# Patient Record
Sex: FEMALE | Race: WHITE | Employment: UNEMPLOYED | URBAN - METROPOLITAN AREA
[De-identification: names, ages, dates, MRNs, and addresses within clinical notes are randomized per-mention and may not be internally consistent; named-entity substitution may affect disease eponyms.]

---
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
## 2025-01-21 ENCOUNTER — SPECIALTY PHARMACY (OUTPATIENT)
Dept: PHARMACY | Facility: CLINIC | Age: 20
End: 2025-01-21

## 2025-01-21 NOTE — PROGRESS NOTES
[facility] Clinical Note  Patient Reassessment     Introduction  Bree Wood is a 19 y.o. female who is on the specialty pharmacy service for management of: Miscellaneous Non-Core.    [facility] supplied medication: Firazyr    Duration of therapy: Patient/Prescriber Specific- as needed for HAE attack    The most recent encounter visit with the referring prescriber Aniceto Calvin MD on 9/4/2024 was reviewed. Pharmacy will continue to collaborate in the care of this patient with the referring prescriber.    Discussion  Bree was contacted on 1/21/2025 at 1:00 PM for a pharmacy visit with encounter number [phone] from:   [facility]  [address] [place] [id number]  Dept: [phone]  Dept Fax: [phone]  Bree consented to a Telephone visit, which was performed.    Efficacy  Patient has developed new symptoms of condition: No  Patient/caregiver feels medication is affecting the disease state: Patient reports no recent swelling events or need for Firazyr administration while on Orladeyo for prevention.    Goals  Provided education on goals and possible outcomes of therapy:  Provided education on goals and possible outcomes of therapy:  Adherence with therapy  Timely completion of appropriate labs  Timely and appropriate follow up with provider  Identify and address medication interactions with presciption medications, OTC medications and supplements  Optimize or maintain quality of life  Patient status for the above goal(s): On track as no recent HAE symptoms (e.g. swelling) per patient    Tolerance  Patient has experienced side effects from this medication: Unable to fully assess if patient has side effects from Firazyr as has not needed recently. Patient reports no side effects from Orladeyo.  Changes to current therapy regimen: No    The follow-up timeline was discussed. Every person responds to and reacts to therapy  differently. Patient should be assessed for efficacy and tolerability in approximately: 3 months    Adherence  Patient Information  Informant: Self (Patient)  Demonstrates Understanding of Importance of Adherence: Yes  Does the patient have any barriers to self-administration (including physical and mental?): No  Action Taken to Mitigate Barriers for Self-Administration: Sister is support system who helps with injections  Support Network for Adherence: Family Member  Medication Information  Medication: icatibant acetate (Firazyr)  Patient Reported Missed Doses in the Last 4 Weeks:  (As needed medication - no recent swelling events on Orladeyo so no Firazyr doses needed per patient)  Adherence Estimation Source: Claims history  Barriers to Adherence: No Problems identified     The importance of adherence was discussed and patient/caregiver was advised to take the medication as prescribed by their provider. Encouraged patient/caregiver to call physician's office or specialty pharmacy if they have a question regarding a missed dose.    General Assessment  Changes to home medications, OTCs or supplements: No  Current Outpatient Medications   Medication Sig Dispense Refill    cetirizine (ZyrTEC) 10 mg tablet Take 1 tablet (10 mg) by mouth once daily. May increase to 10 mg twice a day or even 20 mg twice a day if still having breakouts 60 tablet 0    eletriptan (Relpax) 40 mg tablet Take 1 tablet (40 mg) by mouth 1 time if needed for migraine (at onset of headache). May repeat in 2 hours if unresolved. Do not exceed 80 mg in 24 hours.      famotidine (Pepcid) 20 mg tablet Take 1 tablet (20 mg) by mouth 2 times a day. 120 tablet 0    fexofenadine (Allegra) 60 mg tablet Take 1 tablet (60 mg) by mouth 2 times a day.      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 Inject under the skin in the abdominal area. If no response or symptoms  recur, additional injections may be administered at intervals of at least 6 hours - not to exceed more than 3 injections in 24 hours. 9 mL 1    lanadelumab (Takhzyro) 300 mg/2 mL (150 mg/mL) injection Inject 2 mL (300 mg) under the skin every 14 (fourteen) days. 4 mL 11    norethindrone ac-eth estradioL (Loestrin 1/20, 21,) 1-20 mg-mcg tablet Take 1 tablet by mouth once daily.      norethindrone-e.estradioL-iron (Microgestin FE 1.5/30) 1.5 mg-30 mcg (21)/75 mg (7) tablet Take 1 tablet by mouth once daily. 90 tablet 3    ondansetron ODT (Zofran-ODT) 4 mg disintegrating tablet Take 1 tablet (4 mg) by mouth every 8 hours if needed for nausea or vomiting. 30 tablet 0    Orladeyo capsule Take 1 capsule (110 mg) by mouth once daily.      rimegepant (Nurtec ODT) 75 mg tablet,disintegrating Take 1 tablet (75 mg) by mouth 1 time if needed (at onset of headache).       No current facility-administered medications for this visit.     Reported new allergies: No  Reported new medical conditions: No  Additional monitoring reviewed: Patient reported expiration date for on hand [place] is March 2025. Will need renewal prescription to set up another refill delivery before then. Pharmacy tasks made.  Is laboratory follow up needed? No    Advised to contact the pharmacy if there are any changes to the patient's medication list, including prescriptions, OTC medications, herbal products, or supplements.    Impression/Plan  This patient has not been identified as high risk due to Lack of high risk qualifiers.  The following action was taken: Patient/caregiver encouraged to participate in patient management program.    QOL/Patient Satisfaction  Rate your quality of life on scale of 1-10:  (Unable to assess)  Rate your satisfaction with  Specialty Pharmacy on scale of 1-10: 10 - Completely satisfied (No issues reported)    Provided contact information ([phone]) for [facility] and reviewed dispensing  process, refill timeline and patient management follow up. Confirmed understanding of education conducted during assessment. All questions and concerns were addressed and patient/caregiver was encouraged to reach out for additional questions or concerns.    Based on the patient's diagnosis, medication list, progress towards goals, adherence, tolerance, and medication list, medication remains appropriate: I attest    Jeri Thornton, PharmD

## 2025-02-05 ENCOUNTER — HOSPITAL ENCOUNTER (EMERGENCY)
Facility: HOSPITAL | Age: 20
Discharge: HOME | End: 2025-02-05
Attending: STUDENT IN AN ORGANIZED HEALTH CARE EDUCATION/TRAINING PROGRAM
Payer: COMMERCIAL

## 2025-02-05 VITALS
BODY MASS INDEX: 23.6 KG/M2 | SYSTOLIC BLOOD PRESSURE: 118 MMHG | WEIGHT: 125 LBS | HEART RATE: 64 BPM | TEMPERATURE: 97.2 F | DIASTOLIC BLOOD PRESSURE: 74 MMHG | RESPIRATION RATE: 17 BRPM | OXYGEN SATURATION: 98 % | HEIGHT: 61 IN

## 2025-02-05 DIAGNOSIS — R30.0 DYSURIA: Primary | ICD-10-CM

## 2025-02-05 LAB
APPEARANCE UR: CLEAR
BILIRUB UR STRIP.AUTO-MCNC: NEGATIVE MG/DL
CLUE CELLS SPEC QL WET PREP: NORMAL
COLOR UR: YELLOW
GLUCOSE UR STRIP.AUTO-MCNC: NEGATIVE MG/DL
HCG UR QL IA.RAPID: NEGATIVE
HOLD SPECIMEN: NORMAL
KETONES UR STRIP.AUTO-MCNC: NEGATIVE MG/DL
LEUKOCYTE ESTERASE UR QL STRIP.AUTO: ABNORMAL
MUCOUS THREADS #/AREA URNS AUTO: NORMAL /LPF
NITRITE UR QL STRIP.AUTO: NEGATIVE
PH UR STRIP.AUTO: 5 [PH]
PROT UR STRIP.AUTO-MCNC: NEGATIVE MG/DL
RBC # UR STRIP.AUTO: NEGATIVE MG/DL
RBC #/AREA URNS AUTO: NORMAL /HPF
SP GR UR STRIP.AUTO: 1.02
SQUAMOUS #/AREA URNS AUTO: NORMAL /HPF
T VAGINALIS SPEC QL WET PREP: NORMAL
UROBILINOGEN UR STRIP.AUTO-MCNC: <2 MG/DL
WBC #/AREA URNS AUTO: NORMAL /HPF
WBC VAG QL WET PREP: NORMAL
YEAST VAG QL WET PREP: NORMAL

## 2025-02-05 PROCEDURE — 87086 URINE CULTURE/COLONY COUNT: CPT | Mod: CONLAB | Performed by: STUDENT IN AN ORGANIZED HEALTH CARE EDUCATION/TRAINING PROGRAM

## 2025-02-05 PROCEDURE — 81025 URINE PREGNANCY TEST: CPT | Performed by: STUDENT IN AN ORGANIZED HEALTH CARE EDUCATION/TRAINING PROGRAM

## 2025-02-05 PROCEDURE — 87210 SMEAR WET MOUNT SALINE/INK: CPT | Performed by: STUDENT IN AN ORGANIZED HEALTH CARE EDUCATION/TRAINING PROGRAM

## 2025-02-05 PROCEDURE — 87491 CHLMYD TRACH DNA AMP PROBE: CPT | Mod: CONLAB | Performed by: STUDENT IN AN ORGANIZED HEALTH CARE EDUCATION/TRAINING PROGRAM

## 2025-02-05 PROCEDURE — 99283 EMERGENCY DEPT VISIT LOW MDM: CPT | Performed by: STUDENT IN AN ORGANIZED HEALTH CARE EDUCATION/TRAINING PROGRAM

## 2025-02-05 PROCEDURE — 2500000001 HC RX 250 WO HCPCS SELF ADMINISTERED DRUGS (ALT 637 FOR MEDICARE OP): Mod: SE

## 2025-02-05 PROCEDURE — 81001 URINALYSIS AUTO W/SCOPE: CPT | Performed by: STUDENT IN AN ORGANIZED HEALTH CARE EDUCATION/TRAINING PROGRAM

## 2025-02-05 RX ORDER — DOXYCYCLINE 100 MG/1
100 CAPSULE ORAL 2 TIMES DAILY
Qty: 20 CAPSULE | Refills: 0 | Status: SHIPPED | OUTPATIENT
Start: 2025-02-05 | End: 2025-02-15

## 2025-02-05 RX ORDER — CEFTRIAXONE 500 MG/1
500 INJECTION, POWDER, FOR SOLUTION INTRAMUSCULAR; INTRAVENOUS ONCE
Status: DISCONTINUED | OUTPATIENT
Start: 2025-02-05 | End: 2025-02-05 | Stop reason: HOSPADM

## 2025-02-05 RX ORDER — DOXYCYCLINE HYCLATE 100 MG
TABLET ORAL
Status: COMPLETED
Start: 2025-02-05 | End: 2025-02-05

## 2025-02-05 RX ORDER — DOXYCYCLINE HYCLATE 100 MG
100 TABLET ORAL ONCE
Status: COMPLETED | OUTPATIENT
Start: 2025-02-05 | End: 2025-02-05

## 2025-02-05 RX ADMIN — DOXYCYCLINE HYCLATE 100 MG: 100 TABLET, COATED ORAL at 11:21

## 2025-02-05 RX ADMIN — Medication 100 MG: at 11:21

## 2025-02-05 ASSESSMENT — PAIN - FUNCTIONAL ASSESSMENT
PAIN_FUNCTIONAL_ASSESSMENT: 0-10
PAIN_FUNCTIONAL_ASSESSMENT: 0-10

## 2025-02-05 ASSESSMENT — PAIN SCALES - GENERAL
PAINLEVEL_OUTOF10: 0 - NO PAIN
PAINLEVEL_OUTOF10: 0 - NO PAIN

## 2025-02-05 NOTE — ED PROVIDER NOTES
"Chief Complaint: Dysuria  HPI: This is a 19-year-old female, presenting to the emergency department for dysuria as well as darker colored urine for the last 2 weeks, worse today.  Patient states that she was last sexually active around the time the symptoms started.  She denies any fevers or chills.  She does have some vaginal discharge, however states that there is \"no odor to it\"    Past Medical History:   Diagnosis Date    Hereditary angio-edema       History reviewed. No pertinent surgical history.    Physical Exam  Vitals and nursing note reviewed. Exam conducted with a chaperone present.   Constitutional:       Appearance: Normal appearance.   HENT:      Head: Normocephalic and atraumatic.      Mouth/Throat:      Mouth: Mucous membranes are moist.   Eyes:      Conjunctiva/sclera: Conjunctivae normal.   Cardiovascular:      Rate and Rhythm: Normal rate.   Pulmonary:      Effort: Pulmonary effort is normal.   Abdominal:      General: Abdomen is flat.      Palpations: Abdomen is soft.   Genitourinary:     General: Normal vulva.      Vagina: Normal.      Cervix: Discharge present.      Uterus: Normal.       Adnexa: Right adnexa normal and left adnexa normal.   Musculoskeletal:         General: Normal range of motion.      Cervical back: Normal range of motion.   Skin:     General: Skin is warm and dry.   Neurological:      General: No focal deficit present.      Mental Status: She is alert.   Psychiatric:         Mood and Affect: Mood normal.            ED Course/MDM  Diagnoses as of 02/05/25 1147   Dysuria       This is a 19 y.o. female presenting to the ED for evaluation of dysuria which began about 2 weeks ago and increased in severity today.  Patient states that she was sexually active shortly before the symptoms started.  On physical exam, patient is resting comfortably in bed, no acute distress.  Abdomen is soft and nontender.  Pelvic exam was performed with female chaperone at bedside which is concerning " for scant vaginal discharge.  No cervical motion tenderness.  No adnexal tenderness.  Urinalysis was not concerning for evidence of urinary tract infection.  Wet prep was not concerning for bacterial vaginosis or yeast infection.  I am concerned for possible STD as a cause of the patient's symptoms, as such the patient was given a dose of Rocephin IM as well as doxycycline and prescribed doxycycline for home.  She was advised she can discontinue the antibiotic if the testing comes back negative, however continue to take until that time.  She was advised to return to the emergency department for any new or worsening symptoms and was ultimately discharged home in stable condition.    Final Impression  1.  Dysuria  Disposition/Plan: Discharge home  Condition at disposition: Stable.     Kelly Cope DO  Emergency Medicine Physician     Kelly Cope DO  02/05/25 1148

## 2025-02-05 NOTE — DISCHARGE INSTRUCTIONS
Please follow the remainder of the testing results in your MyChart.  You should receive a call with any positive testing.  You can discontinue the antibiotic if the tests come back negative

## 2025-02-06 LAB
BACTERIA UR CULT: NO GROWTH
C TRACH RRNA SPEC QL NAA+PROBE: NEGATIVE
N GONORRHOEA DNA SPEC QL PROBE+SIG AMP: NEGATIVE

## 2025-02-10 ENCOUNTER — SPECIALTY PHARMACY (OUTPATIENT)
Dept: PHARMACY | Facility: CLINIC | Age: 20
End: 2025-02-10

## 2025-02-10 ENCOUNTER — TELEPHONE (OUTPATIENT)
Dept: ALLERGY | Facility: CLINIC | Age: 20
End: 2025-02-10
Payer: COMMERCIAL

## 2025-02-10 DIAGNOSIS — D84.1: ICD-10-CM

## 2025-02-10 DIAGNOSIS — R60.9 SWELLING: ICD-10-CM

## 2025-02-10 RX ORDER — ICATIBANT 30 MG/3ML
30 INJECTION, SOLUTION SUBCUTANEOUS ONCE AS NEEDED
Qty: 9 ML | Refills: 1 | Status: SHIPPED | OUTPATIENT
Start: 2025-02-10 | End: 2026-02-10

## 2025-02-10 NOTE — TELEPHONE ENCOUNTER
Called and spoke to patient. Patient states that usually swelling episodes of feet happen when she is walking a lot. Per patient, this episode is the worst its been and she has not been walking more than her usual amount. She did wear heels recently, but only for about an hour. She did state she is currently sick and has been for a couple of days with swollen lymph nodes, painful headache, runny nose, and dry throat. She denied having documented fevers, but stated she has felt warm to touch several times the last few days. Patient stated that the swelling has decreased, but is still noticeably swollen in feet. Email was sent to patient to send us photos.  Patient instructed to use 3rd dose if swelling increases or go to main [place] ER if symptoms do not improve. Patient was provided office number and to contact on call provider if significant swelling occurs over night. Patient was wondering if firazyr causes abdominal pain or nausea as a side effect because after injections today she has had those symptoms.     Thanks,   Keri

## 2025-02-10 NOTE — TELEPHONE ENCOUNTER
"----- Message from Aniceto Calvin sent at 2/10/2025 12:44 PM EST -----  Regarding: Angioedema follow up  Jeri Castro sent the message below through chat but we would need to please get a little bit more history on potential triggers. If Bree is still having swelling of her feet she can use a third dose of Firazyr 6 hours after the last one - which would be at 2PM today, but she should not get more than 3 injections in the space of 24 hours. Can she please e-mail us pictures of any areas with swelling if she has those so we can try to look at it? If not improving meanwhile, especially after the third injection, then she might need to go to the ER to at least get a dosage of C1 esterase inhibitor, which I would fear is not available at most [facility] ERs, so might need to go to [facility] for that and I would be able to talk to them there if so.    Just let me know what feedback we hear from Bree so then we can try to copy all of this information into a telephone note.    Thank you very much,    ANAM    Message  \"Hi, she just called me (on phone with her right now) saying she had to use 2 doses of Firazyr for feet swelling.  HUSSAIN  Does #1 late morning yesterday and second 8am this morning. Said feet hurt so much she has been using crutches to get around  4 mins  Keri Ortiz, RN was added by Jeri Thornton, PharmD.  4 mins  HUSSAIN Thornton, PharmD  Said she had been taking her orladeyo - no recent missed doses. Didn't do anything out of the ordinary that she think would have triggered it. Please advise.\"  "

## 2025-02-10 NOTE — PROGRESS NOTES
"Patient called my direct line reporting feet pain and swelling over the past few days. Stated her sister helped her administer 2 doses on Firazyr so far. First was late morning yesterday and second at 8am today. Patient noted ongoing symptoms and stated \"my feet hurt so bad I am using crutches\". Alerted allergist Dr. Calvin and RN right away who will follow up patient on plan. Patient denies any recent missed doses of Orladeyo or any activity out of the ordinary recently that could had triggered HAE swelling event.    Patient Firazyr prescription expires tomorrow and will need renewal prescription sent to pharmacy so she has medication on hand as needed for potential HAE attack in future. Will send order to  Specialty under protocol with MD.  "

## 2025-02-11 ENCOUNTER — HOSPITAL ENCOUNTER (EMERGENCY)
Facility: HOSPITAL | Age: 20
Discharge: HOME | End: 2025-02-11
Attending: EMERGENCY MEDICINE
Payer: COMMERCIAL

## 2025-02-11 ENCOUNTER — SPECIALTY PHARMACY (OUTPATIENT)
Dept: PHARMACY | Facility: CLINIC | Age: 20
End: 2025-02-11

## 2025-02-11 ENCOUNTER — TELEPHONE (OUTPATIENT)
Dept: ALLERGY | Facility: CLINIC | Age: 20
End: 2025-02-11
Payer: COMMERCIAL

## 2025-02-11 VITALS
HEIGHT: 61 IN | HEART RATE: 72 BPM | BODY MASS INDEX: 24.6 KG/M2 | DIASTOLIC BLOOD PRESSURE: 69 MMHG | WEIGHT: 130.29 LBS | RESPIRATION RATE: 19 BRPM | SYSTOLIC BLOOD PRESSURE: 105 MMHG | TEMPERATURE: 97.9 F | OXYGEN SATURATION: 99 %

## 2025-02-11 DIAGNOSIS — D84.1 HEREDITARY ANGIO-EDEMA: Primary | ICD-10-CM

## 2025-02-11 DIAGNOSIS — R11.2 NAUSEA AND VOMITING, UNSPECIFIED VOMITING TYPE: ICD-10-CM

## 2025-02-11 LAB
ALBUMIN SERPL BCP-MCNC: 4.1 G/DL (ref 3.4–5)
ALP SERPL-CCNC: 50 U/L (ref 33–110)
ALT SERPL W P-5'-P-CCNC: 16 U/L (ref 7–45)
ANION GAP SERPL CALC-SCNC: 11 MMOL/L (ref 10–20)
APPEARANCE UR: CLEAR
AST SERPL W P-5'-P-CCNC: 14 U/L (ref 9–39)
B-HCG SERPL-ACNC: <2 MIU/ML
BASOPHILS # BLD AUTO: 0.06 X10*3/UL (ref 0–0.1)
BASOPHILS NFR BLD AUTO: 0.5 %
BILIRUB SERPL-MCNC: 0.3 MG/DL (ref 0–1.2)
BILIRUB UR STRIP.AUTO-MCNC: NEGATIVE MG/DL
BUN SERPL-MCNC: 14 MG/DL (ref 6–23)
CALCIUM SERPL-MCNC: 8.9 MG/DL (ref 8.6–10.3)
CHLORIDE SERPL-SCNC: 102 MMOL/L (ref 98–107)
CO2 SERPL-SCNC: 28 MMOL/L (ref 21–32)
COLOR UR: YELLOW
CREAT SERPL-MCNC: 0.74 MG/DL (ref 0.5–1.05)
EGFRCR SERPLBLD CKD-EPI 2021: >90 ML/MIN/1.73M*2
EOSINOPHIL # BLD AUTO: 0.04 X10*3/UL (ref 0–0.7)
EOSINOPHIL NFR BLD AUTO: 0.3 %
ERYTHROCYTE [DISTWIDTH] IN BLOOD BY AUTOMATED COUNT: 11.5 % (ref 11.5–14.5)
FLUAV RNA RESP QL NAA+PROBE: NOT DETECTED
FLUBV RNA RESP QL NAA+PROBE: NOT DETECTED
GLUCOSE SERPL-MCNC: 120 MG/DL (ref 74–99)
GLUCOSE UR STRIP.AUTO-MCNC: NEGATIVE MG/DL
HCT VFR BLD AUTO: 49.5 % (ref 36–46)
HGB BLD-MCNC: 16.5 G/DL (ref 12–16)
HOLD SPECIMEN: NORMAL
HOLD SPECIMEN: NORMAL
IMM GRANULOCYTES # BLD AUTO: 0.05 X10*3/UL (ref 0–0.7)
IMM GRANULOCYTES NFR BLD AUTO: 0.4 % (ref 0–0.9)
KETONES UR STRIP.AUTO-MCNC: NEGATIVE MG/DL
LACTATE SERPL-SCNC: 1.7 MMOL/L (ref 0.4–2)
LEUKOCYTE ESTERASE UR QL STRIP.AUTO: NEGATIVE
LIPASE SERPL-CCNC: 8 U/L (ref 9–82)
LYMPHOCYTES # BLD AUTO: 3.09 X10*3/UL (ref 1.2–4.8)
LYMPHOCYTES NFR BLD AUTO: 24 %
MAGNESIUM SERPL-MCNC: 1.74 MG/DL (ref 1.6–2.4)
MCH RBC QN AUTO: 31 PG (ref 26–34)
MCHC RBC AUTO-ENTMCNC: 33.3 G/DL (ref 32–36)
MCV RBC AUTO: 93 FL (ref 80–100)
MONOCYTES # BLD AUTO: 0.71 X10*3/UL (ref 0.1–1)
MONOCYTES NFR BLD AUTO: 5.5 %
NEUTROPHILS # BLD AUTO: 8.92 X10*3/UL (ref 1.2–7.7)
NEUTROPHILS NFR BLD AUTO: 69.3 %
NITRITE UR QL STRIP.AUTO: NEGATIVE
NRBC BLD-RTO: 0 /100 WBCS (ref 0–0)
PH UR STRIP.AUTO: 5 [PH]
PLATELET # BLD AUTO: 270 X10*3/UL (ref 150–450)
POTASSIUM SERPL-SCNC: 4 MMOL/L (ref 3.5–5.3)
PROT SERPL-MCNC: 6.7 G/DL (ref 6.4–8.2)
PROT UR STRIP.AUTO-MCNC: NEGATIVE MG/DL
RBC # BLD AUTO: 5.32 X10*6/UL (ref 4–5.2)
RBC # UR STRIP.AUTO: NEGATIVE MG/DL
SARS-COV-2 RNA RESP QL NAA+PROBE: NOT DETECTED
SODIUM SERPL-SCNC: 137 MMOL/L (ref 136–145)
SP GR UR STRIP.AUTO: 1.01
UROBILINOGEN UR STRIP.AUTO-MCNC: <2 MG/DL
WBC # BLD AUTO: 12.9 X10*3/UL (ref 4.4–11.3)

## 2025-02-11 PROCEDURE — 99285 EMERGENCY DEPT VISIT HI MDM: CPT | Mod: 25 | Performed by: EMERGENCY MEDICINE

## 2025-02-11 PROCEDURE — 2500000001 HC RX 250 WO HCPCS SELF ADMINISTERED DRUGS (ALT 637 FOR MEDICARE OP): Mod: JZ,SE | Performed by: FAMILY MEDICINE

## 2025-02-11 PROCEDURE — 96374 THER/PROPH/DIAG INJ IV PUSH: CPT

## 2025-02-11 PROCEDURE — 84702 CHORIONIC GONADOTROPIN TEST: CPT | Performed by: EMERGENCY MEDICINE

## 2025-02-11 PROCEDURE — 96376 TX/PRO/DX INJ SAME DRUG ADON: CPT

## 2025-02-11 PROCEDURE — 83605 ASSAY OF LACTIC ACID: CPT | Performed by: EMERGENCY MEDICINE

## 2025-02-11 PROCEDURE — 83735 ASSAY OF MAGNESIUM: CPT | Performed by: EMERGENCY MEDICINE

## 2025-02-11 PROCEDURE — 81003 URINALYSIS AUTO W/O SCOPE: CPT | Performed by: EMERGENCY MEDICINE

## 2025-02-11 PROCEDURE — RXMED WILLOW AMBULATORY MEDICATION CHARGE

## 2025-02-11 PROCEDURE — 85025 COMPLETE CBC W/AUTO DIFF WBC: CPT | Performed by: EMERGENCY MEDICINE

## 2025-02-11 PROCEDURE — 99284 EMERGENCY DEPT VISIT MOD MDM: CPT | Mod: 25

## 2025-02-11 PROCEDURE — 2500000004 HC RX 250 GENERAL PHARMACY W/ HCPCS (ALT 636 FOR OP/ED): Mod: SE | Performed by: EMERGENCY MEDICINE

## 2025-02-11 PROCEDURE — 80053 COMPREHEN METABOLIC PANEL: CPT | Performed by: EMERGENCY MEDICINE

## 2025-02-11 PROCEDURE — 96361 HYDRATE IV INFUSION ADD-ON: CPT

## 2025-02-11 PROCEDURE — 36415 COLL VENOUS BLD VENIPUNCTURE: CPT | Performed by: EMERGENCY MEDICINE

## 2025-02-11 PROCEDURE — 83690 ASSAY OF LIPASE: CPT | Performed by: EMERGENCY MEDICINE

## 2025-02-11 PROCEDURE — 96375 TX/PRO/DX INJ NEW DRUG ADDON: CPT

## 2025-02-11 PROCEDURE — 87636 SARSCOV2 & INF A&B AMP PRB: CPT | Performed by: EMERGENCY MEDICINE

## 2025-02-11 RX ORDER — SODIUM CHLORIDE 9 MG/ML
125 INJECTION, SOLUTION INTRAVENOUS CONTINUOUS
Status: DISCONTINUED | OUTPATIENT
Start: 2025-02-11 | End: 2025-02-11 | Stop reason: HOSPADM

## 2025-02-11 RX ORDER — KETOROLAC TROMETHAMINE 15 MG/ML
15 INJECTION, SOLUTION INTRAMUSCULAR; INTRAVENOUS ONCE
Status: COMPLETED | OUTPATIENT
Start: 2025-02-11 | End: 2025-02-11

## 2025-02-11 RX ORDER — ONDANSETRON 4 MG/1
4 TABLET, ORALLY DISINTEGRATING ORAL EVERY 8 HOURS PRN
Qty: 20 TABLET | Refills: 0 | Status: SHIPPED | OUTPATIENT
Start: 2025-02-11 | End: 2025-02-18

## 2025-02-11 RX ORDER — ONDANSETRON HYDROCHLORIDE 2 MG/ML
4 INJECTION, SOLUTION INTRAVENOUS ONCE
Status: COMPLETED | OUTPATIENT
Start: 2025-02-11 | End: 2025-02-11

## 2025-02-11 RX ADMIN — SODIUM CHLORIDE 1000 ML: 9 INJECTION, SOLUTION INTRAVENOUS at 03:35

## 2025-02-11 RX ADMIN — SODIUM CHLORIDE 125 ML/HR: 9 INJECTION, SOLUTION INTRAVENOUS at 08:07

## 2025-02-11 RX ADMIN — ONDANSETRON 4 MG: 2 INJECTION INTRAMUSCULAR; INTRAVENOUS at 08:07

## 2025-02-11 RX ADMIN — KETOROLAC TROMETHAMINE 15 MG: 15 INJECTION, SOLUTION INTRAMUSCULAR; INTRAVENOUS at 08:07

## 2025-02-11 RX ADMIN — C1 ESTERASE INHIBITOR RECOMBINANT 1500 UNITS: 2100 INJECTION, POWDER, FOR SOLUTION INTRAVENOUS at 09:14

## 2025-02-11 RX ADMIN — ONDANSETRON 4 MG: 2 INJECTION INTRAMUSCULAR; INTRAVENOUS at 03:35

## 2025-02-11 ASSESSMENT — PAIN SCALES - GENERAL
PAINLEVEL_OUTOF10: 8
PAINLEVEL_OUTOF10: 5 - MODERATE PAIN
PAINLEVEL_OUTOF10: 1

## 2025-02-11 ASSESSMENT — PAIN DESCRIPTION - DESCRIPTORS: DESCRIPTORS: ACHING

## 2025-02-11 ASSESSMENT — PAIN DESCRIPTION - LOCATION
LOCATION: ABDOMEN
LOCATION: FOOT

## 2025-02-11 ASSESSMENT — PAIN DESCRIPTION - ORIENTATION: ORIENTATION: RIGHT;LEFT

## 2025-02-11 ASSESSMENT — PAIN DESCRIPTION - PAIN TYPE: TYPE: ACUTE PAIN

## 2025-02-11 ASSESSMENT — PAIN - FUNCTIONAL ASSESSMENT
PAIN_FUNCTIONAL_ASSESSMENT: 0-10
PAIN_FUNCTIONAL_ASSESSMENT: 0-10

## 2025-02-11 NOTE — TELEPHONE ENCOUNTER
I received a call from the [facility] ED team regarding Bree Wood today at 3:43AM. As described in more detail on note from yesterday (2/10), Bree has been having symptoms compatible with a possible viral infection since 2/9, with fever, headache, nasal discharge, and dry throat. In the setting of this she had a flare-up of her HAE, with swelling of her feet, abdominal pain, nausea, and vomiting. She used one dose of her PRN medication - icatibant (Firazyr) on 2/9 and then a second dosage on 2/10 at 8AM, with some improvement, but still presence of swelling. We recommended a third dose after 2PM on 2/10 if still symptomatic, but also discussed ED visit if refractory to that or any symptom worsening. During the rest of the day yesterday Bree had worsening of her abdominal symptoms, with increased abdominal pain and vomiting so she went to the [facility] ED tonight, taking her rescue IV C1 inhibitor product - Ruconest - with her. Discussed her condition with the ED team and recommended administering dose of Ruconest - Bree's dosage is 3000 units (20 mL of a 150 U/mL after reconstitution of two vials with 14 mL each) slowly IV over approximately 5 minutes for acute attack of HAE. If still symptomatic may use a second dose of 3000 units 2-4 hours later. We also discussed that if abdominal symptoms persist Bree may benefit from getting an abdominal CT to look for evidence of intra-abdominal swelling in the setting of her HAE or rule out other potential causes for her symptoms.

## 2025-02-11 NOTE — TELEPHONE ENCOUNTER
Called Bree to follow up on her ED visit last night/today, call went to voicemail, left a message prompting a call back to assess her for improvement.

## 2025-02-11 NOTE — ED NOTES
Administered patient's own Rocunest (C1 esterase inhibitor - Recombinant) at 3000 units as prescribed by Dr. Calvin of Immunology and cleared by Dr. Ang after phone consult with Pharmacy and on call Immunology.   2 vials of 2100u Rocunest reconstituted (150units/1ml) for IV administration as instructed by  instructions and [employer] resources. 3000 units drawn up (20ml) and administered via IV over 5 minutes. Math checked by Nursing supervisor Eliseo Olivera RN.   Pt has no signs of distress, HR and Spo2 remained unchanged through administration.   RX# [id number]    LOT# [id number]  EXP:      Reva Rojas RN  02/11/25 0511

## 2025-02-11 NOTE — PROGRESS NOTES
"Received phone call from Jessi Fishman PharmD after patient presented to [place] ED regarding \"some questions about what medications she takes at home\". Reviewed Orladeyo is taken for HAE prophylaxis and Firazyr for acute HAE attack. I also reviewed timeline of events with Jessi Fishman PharmD. On 2/10, I received a call from patient who reported feet swelling and use of 2 doses of Firazyr. First was late morning on 2/9 and second was around 8am on 2/10. At the time of this call, I alerted Bree's allergist/immunologist who advised patient to give third dose of Firazyr after 2pm (based on time patient reported second dose was given; injections should be administered at intervals of at least 6 hours with a max of 3 injections in 24 hours) and to go to ED if still symptomatic.    Per Jessi Fishman, patient brought her Ruconest with her to the ED. Per [facility] chart note on 2/11, patient's allergist/immunologist (Aniceto Calvin MD) was consulted to discussed her condition with the ED team and he recommended administering \"Ruconest 3000 units (20 mL of a 150 U/mL after reconstitution of two vials with 14 mL each) slowly IV over approximately 5 minutes for acute attack of HAE. If still symptomatic may use a second dose of 3000 units 2-4 hours later. We also discussed that if abdominal symptoms persist Bree may benefit from getting an abdominal CT to look for evidence of intra-abdominal swelling in the setting of her HAE or rule out other potential causes for her symptoms.\" Reiterated his recommendation to Jessi Fishman PharmD.     Specialty Pharmacy currently working on refill of patient's Firazyr to have on hand at home in case of future HAE attack.      "

## 2025-02-11 NOTE — ED TRIAGE NOTES
Pt to ED for reports of foot swelling and abd swelling. Pt has hx of hereditary angioedema and takes PO meds daily for it. Prescribed subcutaneous injections which she took 2 doses of. Her Immunologist Dr. Gardner stated to come to the ER is worsening symptoms. Pt and her sister present with C1 inhibitor medication that she has stocked for these situations. On call immunology [phone]. Pt suggests conference with ER to on call doctor. Pt also vomiting and having diarrhea with sinusitis since Wednesday.    Pt was diagnosed at the age of 16 with an extensive family history. Pt states last flare up to require this medication was 1 year ago.

## 2025-02-11 NOTE — ED PROVIDER NOTES
HPI   Chief Complaint   Patient presents with   • Foot Swelling         History provided by:  Medical records, patient and relative   used: No      This patient presents to the emergency department via private vehicle for evaluation of abdominal pain, vomiting and swelling to the dorsum of both feet.  Patient reports she has a history of hereditary angioedema and believes this represents an exacerbation.  Patient states symptoms initially began 3 days ago which is the tops of her feet being swollen.  She did contact her immunologist and was told to start her Firazyr injections yesterday.  She took 2 of the recommended 3 doses, but then began to get the abdominal pain and bloating and vomiting.    Patient states she understood from her immunologist that she was to come to [facility] ER to get her IV Ruconest if she were to start vomiting.  Patient states that these are pretty typical symptoms for her exacerbations.  Her last exacerbation was a year ago.  Patient states that she cannot think of any specific trigger for this flare.    She does note she has had congestion and cough for about the past 3 days.  No fever.  She has had some diarrhea.  No urinary symptoms.  She follows with Dr. Calvin at allergy immunology at [facility].       Patient History   Past Medical History:   Diagnosis Date   • Hereditary angio-edema      History reviewed. No pertinent surgical history.  No family history on file.  Social History     Tobacco Use   • Smoking status: Never   • Smokeless tobacco: Never   Vaping Use   • Vaping status: Never Used   Substance Use Topics   • Alcohol use: Never   • Drug use: Yes     Types: Marijuana       Physical Exam   ED Triage Vitals   Temperature Heart Rate Respirations BP   02/11/25 0354 02/11/25 0316 02/11/25 0316 02/11/25 0316   36.7 °C (98 °F) (!) 119 19 129/88      SpO2 Temp src Heart Rate Source Patient Position   02/11/25 0316 -- -- --   99 %         BP Location  FiO2 (%)     -- --             Physical Exam  Vitals reviewed.   Constitutional:       Appearance: Normal appearance.   HENT:      Head: Normocephalic and atraumatic.      Nose: Nose normal.      Mouth/Throat:      Mouth: Mucous membranes are moist.      Comments: No angioedema  Eyes:      Extraocular Movements: Extraocular movements intact.      Conjunctiva/sclera: Conjunctivae normal.      Pupils: Pupils are equal, round, and reactive to light.   Cardiovascular:      Rate and Rhythm: Normal rate and regular rhythm.      Pulses: Normal pulses.      Heart sounds: Normal heart sounds.   Pulmonary:      Effort: Pulmonary effort is normal.      Breath sounds: Normal breath sounds.   Abdominal:      General: Abdomen is flat. Bowel sounds are normal.      Palpations: Abdomen is soft.      Tenderness: There is no abdominal tenderness.   Musculoskeletal:         General: Normal range of motion.      Cervical back: Normal range of motion and neck supple.      Right lower leg: No edema.      Left lower leg: No edema.   Skin:     General: Skin is warm and dry.      Capillary Refill: Capillary refill takes less than 2 seconds.      Findings: No rash.   Neurological:      General: No focal deficit present.      Mental Status: She is alert and oriented to person, place, and time.      Gait: Gait normal.   Psychiatric:         Mood and Affect: Mood normal.           ED Course & MDM   ED Course as of 02/11/25 0751   Tue Feb 11, 2025   0355 Discussed with Dr lazo immunology, advises Ruconest 3000 units IV, may repeat in 4 to 5 hours [MN]   0458 Patient reassessed, results reviewed feeling a little better, declines pain medication [MN]   0539 Patient reassessed [MN]   0621 Patient reassessed [MN]      ED Course User Index  [MN] Rimma Ang MD         Diagnoses as of 02/11/25 0751   Hereditary angio-edema   Nausea and vomiting, unspecified vomiting type          Labs Reviewed   CBC WITH AUTO DIFFERENTIAL - Abnormal        Result Value    WBC 12.9 (*)     nRBC 0.0      RBC 5.32 (*)     Hemoglobin 16.5 (*)     Hematocrit 49.5 (*)     MCV 93      MCH 31.0      MCHC 33.3      RDW 11.5      Platelets 270      Neutrophils % 69.3      Immature Granulocytes %, Automated 0.4      Lymphocytes % 24.0      Monocytes % 5.5      Eosinophils % 0.3      Basophils % 0.5      Neutrophils Absolute 8.92 (*)     Immature Granulocytes Absolute, Automated 0.05      Lymphocytes Absolute 3.09      Monocytes Absolute 0.71      Eosinophils Absolute 0.04      Basophils Absolute 0.06     COMPREHENSIVE METABOLIC PANEL - Abnormal    Glucose 120 (*)     Sodium 137      Potassium 4.0      Chloride 102      Bicarbonate 28      Anion Gap 11      Urea Nitrogen 14      Creatinine 0.74      eGFR >90      Calcium 8.9      Albumin 4.1      Alkaline Phosphatase 50      Total Protein 6.7      AST 14      Bilirubin, Total 0.3      ALT 16     LIPASE - Abnormal    Lipase 8 (*)     Narrative:     Venipuncture immediately after or during the administration of Metamizole may lead to falsely low results. Testing should be performed immediately prior to Metamizole dosing.   MAGNESIUM - Normal    Magnesium 1.74     LACTATE - Normal    Lactate 1.7      Narrative:     Venipuncture immediately after or during the administration of Metamizole may lead to falsely low results. Testing should be performed immediately prior to Metamizole dosing.   SARS-COV-2 AND INFLUENZA A/B PCR - Normal    Flu A Result Not Detected      Flu B Result Not Detected      Coronavirus 2019, PCR Not Detected      Narrative:     This assay is an FDA-cleared, in vitro diagnostic nucleic acid amplification test for the qualitative detection and differentiation of SARS CoV-2/ Influenza A/B from nasopharyngeal specimens collected from individuals with signs and symptoms of respiratory tract infections, and has been validated for use at [facility]. Negative results do not preclude COVID-19/  Influenza A/B infections and should not be used as the sole basis for diagnosis, treatment, or other management decisions. Testing for SARS CoV-2 is recommended only for patients who meet current clinical and/or epidemiological criteria defined by federal, state, or local public health directives.   URINALYSIS WITH REFLEX CULTURE AND MICROSCOPIC - Normal    Color, Urine Yellow      Appearance, Urine Clear      Specific Gravity, Urine 1.009      pH, Urine 5.0      Protein, Urine NEGATIVE      Glucose, Urine NEGATIVE      Blood, Urine NEGATIVE      Ketones, Urine NEGATIVE      Bilirubin, Urine NEGATIVE      Urobilinogen, Urine <2.0      Nitrite, Urine NEGATIVE      Leukocyte Esterase, Urine NEGATIVE     HUMAN CHORIONIC GONADOTROPIN, SERUM QUANTITATIVE - Normal    HCG, Beta-Quantitative <2      Narrative:      Total HCG measurement is performed using the [employer] Access   Immunoassay which detects intact HCG and free beta HCG subunit.    This test is not indicated for use as a tumor marker.   HCG testing is performed using a different test methodology at [facility] than other [facility]. Direct result comparison   should only be made within the same method.       URINALYSIS WITH REFLEX CULTURE AND MICROSCOPIC    Narrative:     The following orders were created for panel order Urinalysis with Reflex Culture and Microscopic.  Procedure                               Abnormality         Status                     ---------                               -----------         ------                     Urinalysis with Reflex C...[[id number]]  Normal              Final result               Extra Urine Gray Tube[[id number]]                            In process                   Please view results for these tests on the individual orders.   EXTRA URINE GRAY TUBE     ED Medication Administration from 02/11/2025 0303 to 02/11/2025 0536         Date/Time Order Dose Route Action Action by     02/11/2025  0335 EST ondansetron (Zofran) injection 4 mg 4 mg intravenous Given Bob, RASHARD     02/11/2025 0335 EST sodium chloride 0.9 % bolus 1,000 mL 1,000 mL intravenous New Bag Bob, RASHARD     02/11/2025 0430 EST sodium chloride 0.9 % bolus 1,000 mL 0 mL intravenous Stopped Bob, B                 No data recorded                                 Medical Decision Making  This patient presents emergency department the above history and physical.  No signs of sepsis, dehydration, acute abdomen.  No lower extremity edema, venous cords, calf tenderness.  Intact peripheral pulses, intact capillary refill.  No angioedema or rash.    Patient treated with IV fluids, IV Zofran.  Case was discussed by phone with Dr. Gardner the patient's allergist immunologist.  He concurs the patient should receive IV Ruconest 3000 units from the medication she brought from home.  He states that this medication may be repeated in 4 to 6 hours if her symptoms are not resolved.  If she continues to have symptoms beyond that she would require transfer to the main campus.    Laboratory evaluation includes negative pregnancy, negative COVID, negative flu.  Peripheral white count 12.9.  Chemistries remarkable only for glucose of 120.  Lactate is normal urinalysis shows no evidence of infection or hematuria.  Electrolytes are normal.    Patient had been starting to get some improvement in pain and resolution of the nausea.  She was passing stool regularly.  She was hopeful she would not need the second dose ofRuconest, I did a trial of oral crackers.  Patient states she feels like that is getting the pain ramped back up again so she believes she is going to need the second dose.  Initial dose was given at 4:35 AM.  Per Dr. Zhou she may get another dose 4 to 5 hours from the initial.  Patient does have adequate supplies for second dose and this will be administered at 9 AM.    0800 --Case signed out to Dr. Bernstein the [facility] physician to follow-up  on response to therapy and make final disposition.    Procedure  Procedures     Rimma Ang MD  02/11/25 0756

## 2025-02-11 NOTE — ED NOTES
Rocunest reconstituted (150units/1ml) for IV administration as instructed by  instructions and [employer] resources. 1500 units drawn up (10ml)(remaining drug amount) and administered via IV over 5 minutes. VSS, pt resting comfortably, no adverse reactions, will continue to monitor pt and try PO challenge in 1 hr. Pt agreeable to plan of care     Delphine Cagle RN  02/11/25 2584

## 2025-02-14 ENCOUNTER — PHARMACY VISIT (OUTPATIENT)
Dept: PHARMACY | Facility: CLINIC | Age: 20
End: 2025-02-14
Payer: MEDICAID

## 2025-02-17 ENCOUNTER — APPOINTMENT (OUTPATIENT)
Dept: RADIOLOGY | Facility: HOSPITAL | Age: 20
End: 2025-02-17
Payer: COMMERCIAL

## 2025-02-17 ENCOUNTER — TELEPHONE (OUTPATIENT)
Dept: ALLERGY | Facility: CLINIC | Age: 20
End: 2025-02-17
Payer: COMMERCIAL

## 2025-02-17 ENCOUNTER — HOSPITAL ENCOUNTER (EMERGENCY)
Facility: HOSPITAL | Age: 20
Discharge: HOME | End: 2025-02-17
Attending: EMERGENCY MEDICINE
Payer: COMMERCIAL

## 2025-02-17 ENCOUNTER — CLINICAL SUPPORT (OUTPATIENT)
Dept: EMERGENCY MEDICINE | Facility: HOSPITAL | Age: 20
End: 2025-02-17
Payer: COMMERCIAL

## 2025-02-17 VITALS
HEIGHT: 61 IN | RESPIRATION RATE: 18 BRPM | SYSTOLIC BLOOD PRESSURE: 114 MMHG | HEART RATE: 87 BPM | WEIGHT: 125 LBS | TEMPERATURE: 97.5 F | BODY MASS INDEX: 23.6 KG/M2 | OXYGEN SATURATION: 98 % | DIASTOLIC BLOOD PRESSURE: 68 MMHG

## 2025-02-17 DIAGNOSIS — R10.84 ABDOMINAL PAIN, GENERALIZED: ICD-10-CM

## 2025-02-17 DIAGNOSIS — D84.1: Primary | ICD-10-CM

## 2025-02-17 LAB
ANION GAP SERPL CALC-SCNC: 16 MMOL/L (ref 10–20)
BASOPHILS # BLD MANUAL: 0 X10*3/UL (ref 0–0.1)
BASOPHILS NFR BLD MANUAL: 0 %
BUN SERPL-MCNC: 19 MG/DL (ref 6–23)
CALCIUM SERPL-MCNC: 8.7 MG/DL (ref 8.6–10.6)
CHLORIDE SERPL-SCNC: 103 MMOL/L (ref 98–107)
CO2 SERPL-SCNC: 20 MMOL/L (ref 21–32)
CREAT SERPL-MCNC: 0.63 MG/DL (ref 0.5–1.05)
EGFRCR SERPLBLD CKD-EPI 2021: >90 ML/MIN/1.73M*2
EOSINOPHIL # BLD MANUAL: 0 X10*3/UL (ref 0–0.7)
EOSINOPHIL NFR BLD MANUAL: 0 %
ERYTHROCYTE [DISTWIDTH] IN BLOOD BY AUTOMATED COUNT: 11.3 % (ref 11.5–14.5)
FLUAV RNA RESP QL NAA+PROBE: NOT DETECTED
FLUBV RNA RESP QL NAA+PROBE: NOT DETECTED
GLUCOSE SERPL-MCNC: 99 MG/DL (ref 74–99)
HCT VFR BLD AUTO: 44.6 % (ref 36–46)
HGB BLD-MCNC: 15.8 G/DL (ref 12–16)
IMM GRANULOCYTES # BLD AUTO: 0.05 X10*3/UL (ref 0–0.7)
IMM GRANULOCYTES NFR BLD AUTO: 0.4 % (ref 0–0.9)
LYMPHOCYTES # BLD MANUAL: 2.15 X10*3/UL (ref 1.2–4.8)
LYMPHOCYTES NFR BLD MANUAL: 15 %
MAGNESIUM SERPL-MCNC: 2.07 MG/DL (ref 1.6–2.4)
MCH RBC QN AUTO: 30.9 PG (ref 26–34)
MCHC RBC AUTO-ENTMCNC: 35.4 G/DL (ref 32–36)
MCV RBC AUTO: 87 FL (ref 80–100)
MONOCYTES # BLD MANUAL: 1.14 X10*3/UL (ref 0.1–1)
MONOCYTES NFR BLD MANUAL: 8 %
NEUTROPHILS # BLD MANUAL: 10.58 X10*3/UL (ref 1.2–7.7)
NEUTS BAND # BLD MANUAL: 1.43 X10*3/UL (ref 0–0.7)
NEUTS BAND NFR BLD MANUAL: 10 %
NEUTS SEG # BLD MANUAL: 9.15 X10*3/UL (ref 1.2–7)
NEUTS SEG NFR BLD MANUAL: 64 %
NRBC BLD-RTO: 0 /100 WBCS (ref 0–0)
PLATELET # BLD AUTO: 307 X10*3/UL (ref 150–450)
POTASSIUM SERPL-SCNC: 4.4 MMOL/L (ref 3.5–5.3)
RBC # BLD AUTO: 5.11 X10*6/UL (ref 4–5.2)
RBC MORPH BLD: ABNORMAL
SARS-COV-2 RNA RESP QL NAA+PROBE: NOT DETECTED
SODIUM SERPL-SCNC: 135 MMOL/L (ref 136–145)
TOTAL CELLS COUNTED BLD: 100
VARIANT LYMPHS # BLD MANUAL: 0.43 X10*3/UL (ref 0–0.5)
VARIANT LYMPHS NFR BLD: 3 %
WBC # BLD AUTO: 14.3 X10*3/UL (ref 4.4–11.3)

## 2025-02-17 PROCEDURE — 80048 BASIC METABOLIC PNL TOTAL CA: CPT | Performed by: EMERGENCY MEDICINE

## 2025-02-17 PROCEDURE — 2550000001 HC RX 255 CONTRASTS: Mod: SE | Performed by: EMERGENCY MEDICINE

## 2025-02-17 PROCEDURE — 2500000004 HC RX 250 GENERAL PHARMACY W/ HCPCS (ALT 636 FOR OP/ED): Mod: SE | Performed by: EMERGENCY MEDICINE

## 2025-02-17 PROCEDURE — 96361 HYDRATE IV INFUSION ADD-ON: CPT

## 2025-02-17 PROCEDURE — 96375 TX/PRO/DX INJ NEW DRUG ADDON: CPT

## 2025-02-17 PROCEDURE — 93005 ELECTROCARDIOGRAM TRACING: CPT

## 2025-02-17 PROCEDURE — 85007 BL SMEAR W/DIFF WBC COUNT: CPT | Performed by: EMERGENCY MEDICINE

## 2025-02-17 PROCEDURE — 85027 COMPLETE CBC AUTOMATED: CPT | Performed by: EMERGENCY MEDICINE

## 2025-02-17 PROCEDURE — 83735 ASSAY OF MAGNESIUM: CPT

## 2025-02-17 PROCEDURE — 99285 EMERGENCY DEPT VISIT HI MDM: CPT | Mod: 25 | Performed by: EMERGENCY MEDICINE

## 2025-02-17 PROCEDURE — 74177 CT ABD & PELVIS W/CONTRAST: CPT

## 2025-02-17 PROCEDURE — 96376 TX/PRO/DX INJ SAME DRUG ADON: CPT

## 2025-02-17 PROCEDURE — 87636 SARSCOV2 & INF A&B AMP PRB: CPT | Performed by: EMERGENCY MEDICINE

## 2025-02-17 PROCEDURE — 36415 COLL VENOUS BLD VENIPUNCTURE: CPT | Performed by: EMERGENCY MEDICINE

## 2025-02-17 PROCEDURE — 99285 EMERGENCY DEPT VISIT HI MDM: CPT | Performed by: EMERGENCY MEDICINE

## 2025-02-17 PROCEDURE — 96374 THER/PROPH/DIAG INJ IV PUSH: CPT | Mod: 59

## 2025-02-17 PROCEDURE — 2500000004 HC RX 250 GENERAL PHARMACY W/ HCPCS (ALT 636 FOR OP/ED): Mod: SE

## 2025-02-17 RX ORDER — MORPHINE SULFATE 4 MG/ML
4 INJECTION INTRAVENOUS ONCE
Status: COMPLETED | OUTPATIENT
Start: 2025-02-17 | End: 2025-02-17

## 2025-02-17 RX ORDER — ICATIBANT 30 MG/3ML
30 INJECTION, SOLUTION SUBCUTANEOUS ONCE
Status: DISCONTINUED | OUTPATIENT
Start: 2025-02-17 | End: 2025-02-17

## 2025-02-17 RX ORDER — ONDANSETRON HYDROCHLORIDE 2 MG/ML
4 INJECTION, SOLUTION INTRAVENOUS ONCE
Status: COMPLETED | OUTPATIENT
Start: 2025-02-17 | End: 2025-02-17

## 2025-02-17 RX ADMIN — ONDANSETRON 4 MG: 2 INJECTION INTRAMUSCULAR; INTRAVENOUS at 17:01

## 2025-02-17 RX ADMIN — ONDANSETRON 4 MG: 2 INJECTION INTRAMUSCULAR; INTRAVENOUS at 14:42

## 2025-02-17 RX ADMIN — MORPHINE SULFATE 4 MG: 4 INJECTION INTRAVENOUS at 16:41

## 2025-02-17 RX ADMIN — SODIUM CHLORIDE, POTASSIUM CHLORIDE, SODIUM LACTATE AND CALCIUM CHLORIDE 1000 ML: 600; 310; 30; 20 INJECTION, SOLUTION INTRAVENOUS at 14:42

## 2025-02-17 RX ADMIN — IOHEXOL 75 ML: 350 INJECTION, SOLUTION INTRAVENOUS at 16:27

## 2025-02-17 ASSESSMENT — PAIN - FUNCTIONAL ASSESSMENT: PAIN_FUNCTIONAL_ASSESSMENT: 0-10

## 2025-02-17 ASSESSMENT — COLUMBIA-SUICIDE SEVERITY RATING SCALE - C-SSRS
6. HAVE YOU EVER DONE ANYTHING, STARTED TO DO ANYTHING, OR PREPARED TO DO ANYTHING TO END YOUR LIFE?: NO
2. HAVE YOU ACTUALLY HAD ANY THOUGHTS OF KILLING YOURSELF?: NO
1. IN THE PAST MONTH, HAVE YOU WISHED YOU WERE DEAD OR WISHED YOU COULD GO TO SLEEP AND NOT WAKE UP?: NO

## 2025-02-17 ASSESSMENT — LIFESTYLE VARIABLES
EVER HAD A DRINK FIRST THING IN THE MORNING TO STEADY YOUR NERVES TO GET RID OF A HANGOVER: NO
HAVE YOU EVER FELT YOU SHOULD CUT DOWN ON YOUR DRINKING: NO
TOTAL SCORE: 0
EVER FELT BAD OR GUILTY ABOUT YOUR DRINKING: NO
HAVE PEOPLE ANNOYED YOU BY CRITICIZING YOUR DRINKING: NO

## 2025-02-17 ASSESSMENT — PAIN DESCRIPTION - FREQUENCY: FREQUENCY: CONSTANT/CONTINUOUS

## 2025-02-17 ASSESSMENT — PAIN DESCRIPTION - PAIN TYPE: TYPE: ACUTE PAIN

## 2025-02-17 ASSESSMENT — PAIN DESCRIPTION - LOCATION
LOCATION: ABDOMEN
LOCATION: ABDOMEN

## 2025-02-17 ASSESSMENT — PAIN SCALES - GENERAL
PAINLEVEL_OUTOF10: 8
PAINLEVEL_OUTOF10: 0 - NO PAIN
PAINLEVEL_OUTOF10: 3
PAINLEVEL_OUTOF10: 4
PAINLEVEL_OUTOF10: 10 - WORST POSSIBLE PAIN

## 2025-02-17 ASSESSMENT — PAIN DESCRIPTION - DESCRIPTORS: DESCRIPTORS: CRAMPING

## 2025-02-17 NOTE — ED PROVIDER NOTES
EMERGENCY DEPARTMENT ENCOUNTER      Pt Name: Bree Wood  MRN: [id number]  Birthdate 2005  Date of evaluation: 2/17/2025    HISTORY OF PRESENT ILLNESS    Bree Wood is an 19 y.o. female with history including type I hereditary angioedema, migraines presenting to the emergency department for abdominal pain, nausea and diarrhea.  Patient's symptoms have been going on for the last week.  She has taken 5 doses of her home rescue medication which normally helps her flareups but has not improved her symptoms.  She is having diarrhea multiple times a day as well as continued nausea and vomiting.  She cannot keep anything down secondary to the discomfort and abdominal pain.  She states the pain is throughout comes in waves and is a cramping stabbing pain.  She called her endocrinology team who told her to come in for CT imaging and evaluation.      PAST MEDICAL HISTORY     Past Medical History:   Diagnosis Date    Hereditary angio-edema        SURGICAL HISTORY     History reviewed. No pertinent surgical history.    CURRENT MEDICATIONS       Discharge Medication List as of 2/17/2025 10:26 PM        CONTINUE these medications which have NOT CHANGED    Details   cetirizine (ZyrTEC) 10 mg tablet Take 1 tablet (10 mg) by mouth once daily. May increase to 10 mg twice a day or even 20 mg twice a day if still having breakouts, Starting Wed 9/4/2024, Until Sun 11/3/2024, Normal      eletriptan (Relpax) 40 mg tablet Take 1 tablet (40 mg) by mouth 1 time if needed for migraine (at onset of headache). May repeat in 2 hours if unresolved. Do not exceed 80 mg in 24 hours., Historical Med      famotidine (Pepcid) 20 mg tablet Take 1 tablet (20 mg) by mouth 2 times a day., Starting Wed 9/4/2024, Until Sun 11/3/2024, Normal      fexofenadine (Allegra) 60 mg tablet Take 1 tablet (60 mg) by mouth 2 times a day., Starting Fri 5/27/2022, Historical Med      icatibant (Firazyr) injection Inject 3 mL (30 mg) under the skin 1 time if  needed for swelling (Swelling due to hereditary angioedema). Inject under the skin in the abdominal area. If no response or symptoms recur, additional injections may be administered at intervals of at least 6  hours - not to exceed more than 3 injections in 24 hours., Starting Mon 2/10/2025, Until Tue 2/10/2026 at 2359, Normal      lanadelumab (Takhzyro) 300 mg/2 mL (150 mg/mL) injection Inject 2 mL (300 mg) under the skin every 14 (fourteen) days., Starting Fri 5/3/2024, Until Sat 5/3/2025, Normal      norethindrone ac-eth estradioL (Loestrin 1/20, 21,) 1-20 mg-mcg tablet Take 1 tablet by mouth once daily., Starting Tue 7/20/2021, Historical Med      norethindrone-e.estradioL-iron (Microgestin FE 1.5/30) 1.5 mg-30 mcg (21)/75 mg (7) tablet Take 1 tablet by mouth once daily., Starting Thu 9/14/2023, Until Fri 9/13/2024, Normal      !! ondansetron ODT (Zofran-ODT) 4 mg disintegrating tablet Take 1 tablet (4 mg) by mouth every 8 hours if needed for nausea or vomiting., Starting Tue 10/22/2024, Normal      !! ondansetron ODT (Zofran-ODT) 4 mg disintegrating tablet Dissolve 1 tablet (4 mg) in the mouth every 8 hours if needed for nausea or vomiting for up to 7 days., Starting Tue 2/11/2025, Until Tue 2/18/2025 at 2359, Normal      Orladeyo capsule Take 1 capsule (110 mg) by mouth once daily., Starting Mon 10/25/2021, Historical Med      rimegepant (Nurtec ODT) 75 mg tablet,disintegrating Take 1 tablet (75 mg) by mouth 1 time if needed (at onset of headache)., Historical Med       !! - Potential duplicate medications found. Please discuss with provider.          ALLERGIES     Sulfa (sulfonamide antibiotics)    FAMILY HISTORY     No family history on file.     SOCIAL HISTORY       Social History     Socioeconomic History    Marital status: Single   Tobacco Use    Smoking status: Never    Smokeless tobacco: Never   Vaping Use    Vaping status: Never Used   Substance and Sexual Activity    Alcohol use: Never    Drug use:  Yes     Types: Marijuana    Sexual activity: Yes       PHYSICAL EXAM       ED Triage Vitals   Temperature Heart Rate Respirations BP   02/17/25 1418 02/17/25 1418 02/17/25 1418 02/17/25 1418   36.4 °C (97.5 °F) (!) 124 18 126/80      Pulse Ox Temp Source Heart Rate Source Patient Position   02/17/25 1418 02/17/25 1418 02/17/25 1418 02/17/25 1418   98 % Oral Monitor Sitting      BP Location FiO2 (%)     02/17/25 1537 --     Right arm        Physical Exam  Vitals and nursing note reviewed.   Constitutional:       General: She is not in acute distress.     Appearance: She is well-developed.   HENT:      Head: Normocephalic and atraumatic.   Eyes:      Conjunctiva/sclera: Conjunctivae normal.   Cardiovascular:      Rate and Rhythm: Normal rate and regular rhythm.      Heart sounds: No murmur heard.  Pulmonary:      Effort: Pulmonary effort is normal. No respiratory distress.      Breath sounds: Normal breath sounds.   Abdominal:      Palpations: Abdomen is soft.      Tenderness: There is generalized abdominal tenderness. There is no guarding or rebound.   Skin:     General: Skin is warm and dry.   Neurological:      General: No focal deficit present.      Mental Status: She is alert and oriented to person, place, and time.          DIAGNOSTIC RESULTS     LABS:  Labs Reviewed   CBC WITH AUTO DIFFERENTIAL - Abnormal       Result Value    WBC 14.3 (*)     nRBC 0.0      RBC 5.11      Hemoglobin 15.8      Hematocrit 44.6      MCV 87      MCH 30.9      MCHC 35.4      RDW 11.3 (*)     Platelets 307      Immature Granulocytes %, Automated 0.4      Immature Granulocytes Absolute, Automated 0.05      Narrative:     The previously reported component Neutrophils % is no longer being reported.  The previously reported component Lymphocytes % is no longer being reported.  The previously reported component Monocytes % is no longer being reported.  The previously   reported component Eosinophils % is no longer being reported.  The  previously reported component Basophils % is no longer being reported.  The previously reported component Absolute Neutrophils is no longer being reported.  The previously reported   component Absolute Lymphocytes is no longer being reported.  The previously reported component Absolute Monocytes is no longer being reported.  The previously reported component Absolute Eosinophils is no longer being reported.  The previously reported   component Absolute Basophils is no longer being reported.   BASIC METABOLIC PANEL - Abnormal    Glucose 99      Sodium 135 (*)     Potassium 4.4      Chloride 103      Bicarbonate 20 (*)     Anion Gap 16      Urea Nitrogen 19      Creatinine 0.63      eGFR >90      Calcium 8.7     MANUAL DIFFERENTIAL - Abnormal    Neutrophils %, Manual 64.0      Bands %, Manual 10.0      Lymphocytes %, Manual 15.0      Monocytes %, Manual 8.0      Eosinophils %, Manual 0.0      Basophils %, Manual 0.0      Atypical Lymphocytes %, Manual 3.0      Seg Neutrophils Absolute, Manual 9.15 (*)     Bands Absolute, Manual 1.43 (*)     Lymphocytes Absolute, Manual 2.15      Monocytes Absolute, Manual 1.14 (*)     Eosinophils Absolute, Manual 0.00      Basophils Absolute, Manual 0.00      Atypical Lymphs Absolute, Manual 0.43      Total Cells Counted 100      Neutrophils Absolute, Manual 10.58 (*)     RBC Morphology No significant RBC morphology present     SARS-COV-2 AND INFLUENZA A/B PCR - Normal    Flu A Result Not Detected      Flu B Result Not Detected      Coronavirus 2019, PCR Not Detected      Narrative:     This assay is an FDA-cleared, in vitro diagnostic nucleic acid amplification test for the qualitative detection and differentiation of SARS CoV-2/ Influenza A/B from nasopharyngeal specimens collected from individuals with signs and symptoms of respiratory tract infections, and has been validated for use at [facility]. Negative results do not preclude COVID-19/ Influenza A/B  infections and should not be used as the sole basis for diagnosis, treatment, or other management decisions. Testing for SARS CoV-2 is recommended only for patients who meet current clinical and/or epidemiological criteria defined by federal, state, or local public health directives.   MAGNESIUM - Normal    Magnesium 2.07         All other labs were within normal range or not returned as of this dictation.    Imaging  CT abdomen pelvis w IV contrast   Final Result   Findings suggestive of intestinal angioedema primarily involving   ileal intestinal loops.        I personally reviewed the images/study and I agree with the findings   as stated by Digna Hawk MD (PGY-2). This study was interpreted at   [place], [place].        MACRO:   None        Signed by: Nate Lewis 2/17/2025 7:02 PM   Dictation workstation:   [id number]           Procedures  Procedures     EMERGENCY DEPARTMENT COURSE/MDM:   Medical Decision Making  Bree Wood is an 19 y.o. female with history including type I hereditary angioedema, migraines presenting to the emergency department for abdominal pain, nausea and diarrhea.  Patient given Zofran fluids and morphine for pain control.  Spoke with her endocrinologist regarding to her care.  They recommend getting a CT scan as there could be other potential causes for her abdominal pain besides angioedema.  If there is evidence of angioedema he recommends Berinert.    Patient CT shows evidence of angioedema and no other acute pathology.  In addition to her lab results were reviewed and interpreted independently unremarkable at this time.  Patient given a dose of Berinert and will be observed for 2 hours.    At the 2-hour apart patient was having good improvement of her abdominal pain and nausea and vomiting.  Patient felt comfortable enough to be able to eat.  Patient was able to tolerate p.o. still feeling well and comfortable enough to go home.   Patient's primary doctor was notified of her response to treatment and he will follow-up with her outpatient in regards to this acute exacerbation.        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    Patient plan, care, lab results and imaging were all discussed with attending.    ED Medications administered this visit:    Medications   ondansetron (Zofran) injection 4 mg (4 mg intravenous Given 2/17/25 1442)   lactated Ringer's bolus 1,000 mL (0 mL intravenous Stopped 2/17/25 1515)   morphine injection 4 mg (4 mg intravenous Given 2/17/25 1641)   iohexol (OMNIPaque) 350 mg iodine/mL solution 75 mL (75 mL intravenous Given 2/17/25 1627)   ondansetron (Zofran) injection 4 mg (4 mg intravenous Given 2/17/25 1701)   c1 esterase inhibitor (human) (Berinert) injection 1,500 Units (1,500 Units intravenous Given 2/17/25 1936)     New Prescriptions from this visit:    Discharge Medication List as of 2/17/2025 10:26 PM          (Please note that portions of this note were completed with a voice recognition program.  Efforts were made to edit the dictations but occasionally words are mis-transcribed.)     Keri Powers DO  Resident  02/18/25 [id number]

## 2025-02-17 NOTE — ED TRIAGE NOTES
Pt presents with swelling and abd pain she also reports N/V/D pt has a hx.of C1 esterase deficiency, and hereditary angio-edema this is her second episode within  a one week span

## 2025-02-17 NOTE — TELEPHONE ENCOUNTER
Bree went to  Main ED due to persistence of symptoms. Spoke with ED team and also with Bree. Labs and CT abdomen obtained, labs with mild leukocytosis/neutrophilia/bands and mild monocytosis, negative viral panel, CT abdomen with edema of her intestinal loops. Discussed with ED team we would recommend IV C1inh with Berinert 1500U IV adequate for her age/weight. If symptoms not improving she may get a second 1500U IV dosage 2 hours later. If not improving might need admission for IVF re-hydration and to extend work-up. Discussed with Bree she will regardless need close follow-up so we can re-discuss her HAE prophylaxis and emergency treatment options, given her recent recurrent flare-ups.

## 2025-02-17 NOTE — TELEPHONE ENCOUNTER
Received a call from Bree Wood stating that they have had and increase of symptoms. On Saturday she had knee swelling, which ice helped. Now patient is experiencing a consistent stomach pain, vomiting and diarrhea since Sunday. Current medication regimen includes a Firazyr at 11 am and 10 pm with no symptom improvement. Per Dr. Calvin I informed Bree to go to [facility] for CT scan.

## 2025-02-17 NOTE — ED TRIAGE NOTES
TRIAGE NOTE   I saw the patient as the Clinician in Triage and performed a brief history and physical exam, established acuity, and ordered appropriate tests to develop basic plan of care. Patient will be seen by an REBECCA, resident and/or physician who will independently evaluate the patient. Please see subsequent provider notes for further details and disposition.     Brief HPI: In brief, Bree Wood is a 19 y.o. female that presents for vomiting, abdominal pain  She has a history of C1 esterase deficiency and presented with similar symptoms last week  She received 5 doses of icadabant and still having symptoms so her allergy/immunology physician (Dr Aniceto Calvin) is not convinced that she needs more?  We are holding on that dose for now.     Focused Physical exam:   Pale awake retching  Diffuse abdominal tenderness  Some joint edema      Plan/MDM:     [] symptom control with fluids and zofran  [] CT scan  [] call Dr. Calvin after CT scan      Please see subsequent provider note for further details and disposition

## 2025-02-18 LAB
ATRIAL RATE: 127 BPM
P AXIS: 84 DEGREES
P OFFSET: 214 MS
P ONSET: 164 MS
PR INTERVAL: 112 MS
Q ONSET: 220 MS
QRS COUNT: 21 BEATS
QRS DURATION: 84 MS
QT INTERVAL: 304 MS
QTC CALCULATION(BAZETT): 441 MS
QTC FREDERICIA: 390 MS
R AXIS: 55 DEGREES
T AXIS: -79 DEGREES
T OFFSET: 372 MS
VENTRICULAR RATE: 127 BPM

## 2025-02-18 NOTE — DISCHARGE INSTRUCTIONS
Please follow-up with Dr. Calvin in regards to this ED visit.  If you have any new or concerning symptoms please return to the ED for reevaluation.  Otherwise continue your daily medications as prescribed.

## 2025-02-22 ENCOUNTER — HOSPITAL ENCOUNTER (INPATIENT)
Facility: HOSPITAL | Age: 20
End: 2025-02-22
Attending: STUDENT IN AN ORGANIZED HEALTH CARE EDUCATION/TRAINING PROGRAM | Admitting: INTERNAL MEDICINE
Payer: COMMERCIAL

## 2025-02-22 DIAGNOSIS — R30.0 DYSURIA: ICD-10-CM

## 2025-02-22 DIAGNOSIS — D84.1 HEREDITARY ANGIOEDEMA (MULTI): Primary | ICD-10-CM

## 2025-02-22 LAB
ABO GROUP (TYPE) IN BLOOD: NORMAL
ANION GAP SERPL CALC-SCNC: 14 MMOL/L (ref 10–20)
ANTIBODY SCREEN: NORMAL
APTT PPP: 24 SECONDS (ref 27–38)
B-HCG SERPL-ACNC: <3 MIU/ML
BASOPHILS # BLD AUTO: 0.11 X10*3/UL (ref 0–0.1)
BASOPHILS NFR BLD AUTO: 1.2 %
BUN SERPL-MCNC: 9 MG/DL (ref 6–23)
CALCIUM SERPL-MCNC: 8.7 MG/DL (ref 8.6–10.6)
CHLORIDE SERPL-SCNC: 105 MMOL/L (ref 98–107)
CO2 SERPL-SCNC: 24 MMOL/L (ref 21–32)
CREAT SERPL-MCNC: 0.58 MG/DL (ref 0.5–1.05)
EGFRCR SERPLBLD CKD-EPI 2021: >90 ML/MIN/1.73M*2
EOSINOPHIL # BLD AUTO: 0 X10*3/UL (ref 0–0.7)
EOSINOPHIL NFR BLD AUTO: 0 %
ERYTHROCYTE [DISTWIDTH] IN BLOOD BY AUTOMATED COUNT: 11.5 % (ref 11.5–14.5)
GLUCOSE SERPL-MCNC: 121 MG/DL (ref 74–99)
HCT VFR BLD AUTO: 38.8 % (ref 36–46)
HGB BLD-MCNC: 14.2 G/DL (ref 12–16)
IMM GRANULOCYTES # BLD AUTO: 0.02 X10*3/UL (ref 0–0.7)
IMM GRANULOCYTES NFR BLD AUTO: 0.2 % (ref 0–0.9)
INR PPP: 0.9 (ref 0.9–1.1)
LYMPHOCYTES # BLD AUTO: 6.74 X10*3/UL (ref 1.2–4.8)
LYMPHOCYTES NFR BLD AUTO: 71.3 %
MCH RBC QN AUTO: 31.8 PG (ref 26–34)
MCHC RBC AUTO-ENTMCNC: 36.6 G/DL (ref 32–36)
MCV RBC AUTO: 87 FL (ref 80–100)
MONOCYTES # BLD AUTO: 0.28 X10*3/UL (ref 0.1–1)
MONOCYTES NFR BLD AUTO: 3 %
NEUTROPHILS # BLD AUTO: 2.3 X10*3/UL (ref 1.2–7.7)
NEUTROPHILS NFR BLD AUTO: 24.3 %
NRBC BLD-RTO: 0 /100 WBCS (ref 0–0)
PLATELET # BLD AUTO: 233 X10*3/UL (ref 150–450)
POTASSIUM SERPL-SCNC: 3.5 MMOL/L (ref 3.5–5.3)
PROTHROMBIN TIME: 10.6 SECONDS (ref 9.8–12.8)
RBC # BLD AUTO: 4.46 X10*6/UL (ref 4–5.2)
RBC MORPH BLD: NORMAL
RH FACTOR (ANTIGEN D): NORMAL
SODIUM SERPL-SCNC: 139 MMOL/L (ref 136–145)
WBC # BLD AUTO: 9.5 X10*3/UL (ref 4.4–11.3)

## 2025-02-22 PROCEDURE — 80048 BASIC METABOLIC PNL TOTAL CA: CPT | Performed by: STUDENT IN AN ORGANIZED HEALTH CARE EDUCATION/TRAINING PROGRAM

## 2025-02-22 PROCEDURE — 99285 EMERGENCY DEPT VISIT HI MDM: CPT | Mod: 25 | Performed by: STUDENT IN AN ORGANIZED HEALTH CARE EDUCATION/TRAINING PROGRAM

## 2025-02-22 PROCEDURE — 1210000001 HC SEMI-PRIVATE ROOM DAILY

## 2025-02-22 PROCEDURE — 2500000004 HC RX 250 GENERAL PHARMACY W/ HCPCS (ALT 636 FOR OP/ED): Mod: JZ,SE

## 2025-02-22 PROCEDURE — 85610 PROTHROMBIN TIME: CPT | Performed by: STUDENT IN AN ORGANIZED HEALTH CARE EDUCATION/TRAINING PROGRAM

## 2025-02-22 PROCEDURE — 96374 THER/PROPH/DIAG INJ IV PUSH: CPT

## 2025-02-22 PROCEDURE — 85730 THROMBOPLASTIN TIME PARTIAL: CPT | Performed by: STUDENT IN AN ORGANIZED HEALTH CARE EDUCATION/TRAINING PROGRAM

## 2025-02-22 PROCEDURE — 85025 COMPLETE CBC W/AUTO DIFF WBC: CPT | Performed by: STUDENT IN AN ORGANIZED HEALTH CARE EDUCATION/TRAINING PROGRAM

## 2025-02-22 PROCEDURE — 36415 COLL VENOUS BLD VENIPUNCTURE: CPT | Performed by: STUDENT IN AN ORGANIZED HEALTH CARE EDUCATION/TRAINING PROGRAM

## 2025-02-22 PROCEDURE — 99285 EMERGENCY DEPT VISIT HI MDM: CPT | Performed by: STUDENT IN AN ORGANIZED HEALTH CARE EDUCATION/TRAINING PROGRAM

## 2025-02-22 PROCEDURE — 86003 ALLG SPEC IGE CRUDE XTRC EA: CPT | Performed by: STUDENT IN AN ORGANIZED HEALTH CARE EDUCATION/TRAINING PROGRAM

## 2025-02-22 PROCEDURE — 86901 BLOOD TYPING SEROLOGIC RH(D): CPT | Performed by: STUDENT IN AN ORGANIZED HEALTH CARE EDUCATION/TRAINING PROGRAM

## 2025-02-22 PROCEDURE — 84702 CHORIONIC GONADOTROPIN TEST: CPT | Performed by: STUDENT IN AN ORGANIZED HEALTH CARE EDUCATION/TRAINING PROGRAM

## 2025-02-22 RX ORDER — FAMOTIDINE 20 MG/1
20 TABLET, FILM COATED ORAL 2 TIMES DAILY
Status: CANCELLED | OUTPATIENT
Start: 2025-02-22

## 2025-02-22 RX ORDER — ICATIBANT 30 MG/3ML
30 INJECTION, SOLUTION SUBCUTANEOUS ONCE AS NEEDED
Status: CANCELLED | OUTPATIENT
Start: 2025-02-22

## 2025-02-22 RX ORDER — MORPHINE SULFATE 4 MG/ML
4 INJECTION INTRAVENOUS ONCE AS NEEDED
Status: DISCONTINUED | OUTPATIENT
Start: 2025-02-22 | End: 2025-02-23

## 2025-02-22 RX ORDER — CETIRIZINE HYDROCHLORIDE 10 MG/1
10 TABLET ORAL DAILY
Status: CANCELLED | OUTPATIENT
Start: 2025-02-23

## 2025-02-22 RX ORDER — ICATIBANT 30 MG/3ML
30 INJECTION, SOLUTION SUBCUTANEOUS ONCE
Status: COMPLETED | OUTPATIENT
Start: 2025-02-22 | End: 2025-02-22

## 2025-02-22 RX ORDER — ACETAMINOPHEN 325 MG/1
650 TABLET ORAL EVERY 6 HOURS PRN
Status: DISCONTINUED | OUTPATIENT
Start: 2025-02-22 | End: 2025-02-24 | Stop reason: HOSPADM

## 2025-02-22 RX ADMIN — ICATIBANT 30 MG: 30 INJECTION SUBCUTANEOUS at 21:40

## 2025-02-22 RX ADMIN — HUMAN C1-ESTERASE INHIBITOR 1500 UNITS: KIT at 17:06

## 2025-02-22 SDOH — SOCIAL STABILITY: SOCIAL INSECURITY: DOES ANYONE TRY TO KEEP YOU FROM HAVING/CONTACTING OTHER FRIENDS OR DOING THINGS OUTSIDE YOUR HOME?: NO

## 2025-02-22 SDOH — SOCIAL STABILITY: SOCIAL NETWORK: HOW OFTEN DO YOU ATTEND MEETINGS OF THE CLUBS OR ORGANIZATIONS YOU BELONG TO?: PATIENT DECLINED

## 2025-02-22 SDOH — SOCIAL STABILITY: SOCIAL INSECURITY: HAVE YOU HAD THOUGHTS OF HARMING ANYONE ELSE?: NO

## 2025-02-22 SDOH — SOCIAL STABILITY: SOCIAL INSECURITY: ARE YOU MARRIED, WIDOWED, DIVORCED, SEPARATED, NEVER MARRIED, OR LIVING WITH A PARTNER?: PATIENT DECLINED

## 2025-02-22 SDOH — HEALTH STABILITY: MENTAL HEALTH
DO YOU FEEL STRESS - TENSE, RESTLESS, NERVOUS, OR ANXIOUS, OR UNABLE TO SLEEP AT NIGHT BECAUSE YOUR MIND IS TROUBLED ALL THE TIME - THESE DAYS?: PATIENT DECLINED

## 2025-02-22 SDOH — ECONOMIC STABILITY: HOUSING INSECURITY: IN THE PAST 12 MONTHS, HOW MANY TIMES HAVE YOU MOVED WHERE YOU WERE LIVING?: 0

## 2025-02-22 SDOH — SOCIAL STABILITY: SOCIAL INSECURITY: WITHIN THE LAST YEAR, HAVE YOU BEEN HUMILIATED OR EMOTIONALLY ABUSED IN OTHER WAYS BY YOUR PARTNER OR EX-PARTNER?: NO

## 2025-02-22 SDOH — ECONOMIC STABILITY: HOUSING INSECURITY: IN THE LAST 12 MONTHS, WAS THERE A TIME WHEN YOU WERE NOT ABLE TO PAY THE MORTGAGE OR RENT ON TIME?: PATIENT DECLINED

## 2025-02-22 SDOH — SOCIAL STABILITY: SOCIAL INSECURITY
WITHIN THE LAST YEAR, HAVE YOU BEEN KICKED, HIT, SLAPPED, OR OTHERWISE PHYSICALLY HURT BY YOUR PARTNER OR EX-PARTNER?: NO

## 2025-02-22 SDOH — SOCIAL STABILITY: SOCIAL INSECURITY: HAS ANYONE EVER THREATENED TO HURT YOUR FAMILY OR YOUR PETS?: NO

## 2025-02-22 SDOH — ECONOMIC STABILITY: FOOD INSECURITY: HOW HARD IS IT FOR YOU TO PAY FOR THE VERY BASICS LIKE FOOD, HOUSING, MEDICAL CARE, AND HEATING?: PATIENT DECLINED

## 2025-02-22 SDOH — SOCIAL STABILITY: SOCIAL NETWORK: HOW OFTEN DO YOU GET TOGETHER WITH FRIENDS OR RELATIVES?: PATIENT DECLINED

## 2025-02-22 SDOH — SOCIAL STABILITY: SOCIAL INSECURITY: ABUSE: ADULT

## 2025-02-22 SDOH — HEALTH STABILITY: PHYSICAL HEALTH: ON AVERAGE, HOW MANY DAYS PER WEEK DO YOU ENGAGE IN MODERATE TO STRENUOUS EXERCISE (LIKE A BRISK WALK)?: 2 DAYS

## 2025-02-22 SDOH — SOCIAL STABILITY: SOCIAL NETWORK: IN A TYPICAL WEEK, HOW MANY TIMES DO YOU TALK ON THE PHONE WITH FAMILY, FRIENDS, OR NEIGHBORS?: PATIENT DECLINED

## 2025-02-22 SDOH — SOCIAL STABILITY: SOCIAL INSECURITY: DO YOU FEEL ANYONE HAS EXPLOITED OR TAKEN ADVANTAGE OF YOU FINANCIALLY OR OF YOUR PERSONAL PROPERTY?: NO

## 2025-02-22 SDOH — HEALTH STABILITY: PHYSICAL HEALTH
HOW OFTEN DO YOU NEED TO HAVE SOMEONE HELP YOU WHEN YOU READ INSTRUCTIONS, PAMPHLETS, OR OTHER WRITTEN MATERIAL FROM YOUR DOCTOR OR PHARMACY?: NEVER

## 2025-02-22 SDOH — ECONOMIC STABILITY: FOOD INSECURITY: WITHIN THE PAST 12 MONTHS, THE FOOD YOU BOUGHT JUST DIDN'T LAST AND YOU DIDN'T HAVE MONEY TO GET MORE.: PATIENT DECLINED

## 2025-02-22 SDOH — ECONOMIC STABILITY: FOOD INSECURITY
WITHIN THE PAST 12 MONTHS, YOU WORRIED THAT YOUR FOOD WOULD RUN OUT BEFORE YOU GOT THE MONEY TO BUY MORE.: PATIENT DECLINED

## 2025-02-22 SDOH — SOCIAL STABILITY: SOCIAL INSECURITY: ARE THERE ANY APPARENT SIGNS OF INJURIES/BEHAVIORS THAT COULD BE RELATED TO ABUSE/NEGLECT?: NO

## 2025-02-22 SDOH — ECONOMIC STABILITY: TRANSPORTATION INSECURITY
IN THE PAST 12 MONTHS, HAS LACK OF TRANSPORTATION KEPT YOU FROM MEDICAL APPOINTMENTS OR FROM GETTING MEDICATIONS?: PATIENT DECLINED

## 2025-02-22 SDOH — SOCIAL STABILITY: SOCIAL INSECURITY
WITHIN THE LAST YEAR, HAVE YOU BEEN RAPED OR FORCED TO HAVE ANY KIND OF SEXUAL ACTIVITY BY YOUR PARTNER OR EX-PARTNER?: NO

## 2025-02-22 SDOH — ECONOMIC STABILITY: HOUSING INSECURITY: AT ANY TIME IN THE PAST 12 MONTHS, WERE YOU HOMELESS OR LIVING IN A SHELTER (INCLUDING NOW)?: PATIENT DECLINED

## 2025-02-22 SDOH — SOCIAL STABILITY: SOCIAL INSECURITY: ARE YOU OR HAVE YOU BEEN THREATENED OR ABUSED PHYSICALLY, EMOTIONALLY, OR SEXUALLY BY ANYONE?: NO

## 2025-02-22 SDOH — SOCIAL STABILITY: SOCIAL INSECURITY: DO YOU FEEL UNSAFE GOING BACK TO THE PLACE WHERE YOU ARE LIVING?: NO

## 2025-02-22 SDOH — SOCIAL STABILITY: SOCIAL INSECURITY: WITHIN THE LAST YEAR, HAVE YOU BEEN AFRAID OF YOUR PARTNER OR EX-PARTNER?: NO

## 2025-02-22 SDOH — SOCIAL STABILITY: SOCIAL NETWORK
DO YOU BELONG TO ANY CLUBS OR ORGANIZATIONS SUCH AS CHURCH GROUPS, UNIONS, FRATERNAL OR ATHLETIC GROUPS, OR SCHOOL GROUPS?: PATIENT DECLINED

## 2025-02-22 SDOH — SOCIAL STABILITY: SOCIAL NETWORK: HOW OFTEN DO YOU ATTEND CHURCH OR RELIGIOUS SERVICES?: PATIENT DECLINED

## 2025-02-22 SDOH — ECONOMIC STABILITY: INCOME INSECURITY
IN THE PAST 12 MONTHS HAS THE ELECTRIC, GAS, OIL, OR WATER COMPANY THREATENED TO SHUT OFF SERVICES IN YOUR HOME?: PATIENT DECLINED

## 2025-02-22 SDOH — SOCIAL STABILITY: SOCIAL INSECURITY: WERE YOU ABLE TO COMPLETE ALL THE BEHAVIORAL HEALTH SCREENINGS?: YES

## 2025-02-22 SDOH — SOCIAL STABILITY: SOCIAL INSECURITY: HAVE YOU HAD ANY THOUGHTS OF HARMING ANYONE ELSE?: NO

## 2025-02-22 SDOH — HEALTH STABILITY: PHYSICAL HEALTH: ON AVERAGE, HOW MANY MINUTES DO YOU ENGAGE IN EXERCISE AT THIS LEVEL?: 30 MIN

## 2025-02-22 ASSESSMENT — COGNITIVE AND FUNCTIONAL STATUS - GENERAL
DAILY ACTIVITIY SCORE: 24
PATIENT BASELINE BEDBOUND: NO
MOBILITY SCORE: 24

## 2025-02-22 ASSESSMENT — ACTIVITIES OF DAILY LIVING (ADL)
ADEQUATE_TO_COMPLETE_ADL: YES
LACK_OF_TRANSPORTATION: PATIENT DECLINED
HEARING - RIGHT EAR: FUNCTIONAL
JUDGMENT_ADEQUATE_SAFELY_COMPLETE_DAILY_ACTIVITIES: YES
GROOMING: INDEPENDENT
FEEDING YOURSELF: INDEPENDENT
DRESSING YOURSELF: INDEPENDENT
PATIENT'S MEMORY ADEQUATE TO SAFELY COMPLETE DAILY ACTIVITIES?: YES
WALKS IN HOME: INDEPENDENT
HEARING - LEFT EAR: FUNCTIONAL
TOILETING: INDEPENDENT
BATHING: INDEPENDENT

## 2025-02-22 ASSESSMENT — LIFESTYLE VARIABLES
HOW MANY STANDARD DRINKS CONTAINING ALCOHOL DO YOU HAVE ON A TYPICAL DAY: PATIENT DOES NOT DRINK
AUDIT-C TOTAL SCORE: 0
SUBSTANCE_ABUSE_PAST_12_MONTHS: NO
HOW OFTEN DO YOU HAVE 6 OR MORE DRINKS ON ONE OCCASION: NEVER
HOW OFTEN DO YOU HAVE A DRINK CONTAINING ALCOHOL: NEVER
SKIP TO QUESTIONS 9-10: 1
AUDIT-C TOTAL SCORE: 0
PRESCIPTION_ABUSE_PAST_12_MONTHS: NO

## 2025-02-22 ASSESSMENT — PATIENT HEALTH QUESTIONNAIRE - PHQ9
2. FEELING DOWN, DEPRESSED OR HOPELESS: NOT AT ALL
1. LITTLE INTEREST OR PLEASURE IN DOING THINGS: NOT AT ALL
SUM OF ALL RESPONSES TO PHQ9 QUESTIONS 1 & 2: 0

## 2025-02-22 ASSESSMENT — PAIN DESCRIPTION - PROGRESSION: CLINICAL_PROGRESSION: NOT CHANGED

## 2025-02-22 ASSESSMENT — COLUMBIA-SUICIDE SEVERITY RATING SCALE - C-SSRS
1. IN THE PAST MONTH, HAVE YOU WISHED YOU WERE DEAD OR WISHED YOU COULD GO TO SLEEP AND NOT WAKE UP?: NO
2. HAVE YOU ACTUALLY HAD ANY THOUGHTS OF KILLING YOURSELF?: NO
6. HAVE YOU EVER DONE ANYTHING, STARTED TO DO ANYTHING, OR PREPARED TO DO ANYTHING TO END YOUR LIFE?: NO

## 2025-02-22 ASSESSMENT — PAIN - FUNCTIONAL ASSESSMENT
PAIN_FUNCTIONAL_ASSESSMENT: 0-10
PAIN_FUNCTIONAL_ASSESSMENT: 0-10

## 2025-02-22 ASSESSMENT — PAIN SCALES - GENERAL
PAINLEVEL_OUTOF10: 7
PAINLEVEL_OUTOF10: 0 - NO PAIN

## 2025-02-22 NOTE — TELECONSULT
Patient is a 19 year old female with a past medical history of hereditary angioedema Type one who presented at [facility] for Bilateral hand swelling, right >left. Patient is on Orladayo (PPX) and uses Icatibant as rescue which she did not use today. She has been having recurrent flares the past 2 weekends.     2/23/25  Patient received Berinert (rescue) with minimal improvement. After discussion, she agreed to take one dose of icatibant (rescue) last night. No issues over night and was admitted overnight. This morning per report she is doing better but continues to have swelling. Sister is willing to bring Ruconest (rescue) from home to be administered in the hospital. Would agree to give Ruconest as she has been having increased flares in the past 3 weeks and monitor. She is okay to discharge if she continues to improve as she has icatibant (rescue) at home. She has a FOV scheduled with Dr. Calvin on March 17 who is trying to get her approved for TAKHZYRO (ppx). [facility] will contact her for possibly a closer fov on Monday.     Kierra Gifford   Allergy Immunology Fellow PGY 5   Please Carltonkarlin for any further questions

## 2025-02-22 NOTE — CONSULTS
Reason For Consult  BL hand swelling/ r/o compartment syndrome    History Of Present Illness  Bree Wood is a 19 y.o. female presenting with BL hand swelling in the setting of hereditary angioedema. Pt reports swelling started this am and progressed over the last few hours. She is unsure of a specific trigger at this time. She reports a similar episode of hand swelling 4 years ago which was treated with FFP at that time. Currently minimal pain, sensation intact, denies weakness.     Past Medical History  She has a past medical history of Hereditary angio-edema.    Surgical History  She has no past surgical history on file.     Social History  She reports that she has never smoked. She has never used smokeless tobacco. She reports current drug use. Drug: Marijuana. She reports that she does not drink alcohol.    Family History  No family history on file.     Allergies  Sulfa (sulfonamide antibiotics)    Review of Systems  Complete ros reviewed and negative except per HPI     Physical Exam  Constitutional: Comfortable, NAD  HEENT: hearing and vision grossly intact, MMM  Resp: breathing comfortably   CV: extremities warm, well perfused  GI: abd soft  Neuro: AAOx3, sensory and motor grossly intact  Psych: Appropriate mood and affect  MSK: BL UE  Exam of bilateral upper extremities reveals significant swelling right greater than left, but soft and compressible compartments throughout bilateral hands.  No pain with passive stretch of digits or wrist.  Motor and sensation intact in median ulnar radial distributions.  Capillary refill less than 2 seconds in all digits.  Palpable radial pulse.     Last Recorded Vitals  Blood pressure 130/84, pulse 90, temperature 37 °C (98.6 °F), resp. rate 20, SpO2 98%.    Relevant Results  None pertinent     Assessment/Plan     19-year-old female with bilateral hand swelling in the setting of hereditary angioedema.  Ortho consulted for evaluation and rule out compartment syndrome.   Low concern for acute compartment syndrome on exam at this time.  -No acute orthopedic hand surgical intervention indicated at this time.  -Recommend aggressive bilateral hand elevation and continued monitoring  -Ortho hand team will reevaluate later this evening to ensure improvement in symptoms.  -Medical management per emergency department and rheumatology.  -Patient educated on red flag symptoms including worsening pain, numbness, cool fingers, inability to flex or straighten the fingers.  If any new or worsening symptoms develop she will alert the team promptly for repeat evaluation.  -D/W attending Dr. Lafleur.      Omar Quach MD

## 2025-02-22 NOTE — ED PROVIDER NOTES
Emergency Department Provider Note        History of Present Illness     History provided by: Patient  Limitations to History: None  External Records Reviewed with Brief Summary:  Recent visit 2/17 for intestinal angioedema.  At this time patient's primary allergist was contacted recommending IV C1inh and Berinert     HPI:  Bree Wood is a 19 y.o. female history of hereditary angioedema presenting with a concern of hand swelling.  Patient states this started acutely this morning.  She states this is consistent with prior episodes of angioedema, she states that when she was first diagnosed it affected her hands however she states currently she feels like this is worse.  She states even in the car ride she is felt like the swelling is gone up her hand into her wrist.  She endorses decreased sensation to the right hand, no paresthesias and is not painful.  She states she is on a daily home medication, Orladeyo, which she had today. She has icatibant at home but she didn't take it and didn't bring it with her. She feels like has not been improving as she has had a flare every weekend for the past 3 weeks.  Patient was unable to get a hold of her immunologist.  She denies history of intubation and is denying shortness of breath    Physical Exam   Triage vitals:  T 37 °C (98.6 °F)  HR 90  /84  RR 20  O2 98 % None (Room air)    General: Awake, alert, in no acute distress  Eyes: Gaze conjugate.  No scleral icterus or injection  HENT: Normo-cephalic, atraumatic. No stridor  CV: Regular rate, regular rhythm. Radial pulses 2+ bilaterally  Resp: Breathing non-labored, speaking in full sentences.  Clear to auscultation bilaterally  GI: Soft, non-distended, non-tender. No rebound or guarding.  MSK/Extremities: Severe swelling of the right greater than left hands.  The right hand swells up into the mid forearm with obvious color difference.  Decreased sensation.  Deformity of the finger secondary to edema with  limited range of motion of the fingers.  2+ bilateral pulses.  Delayed capillary refill  Skin: Warm. Appropriate color  Neuro: Alert. Oriented. Face symmetric. Speech is fluent.  Gross strength and sensation intact in b/l UE and LEs  Psych: Appropriate mood and affect    Medical Decision Making & ED Course   Medical Decision Makin y.o. female with history of hereditary angioedema presenting with bilateral hand swelling starting this morning.  Vitals are stable upon arrival, patient saturating well and in no acute distress.  She is not endorsing airway concerns.  On physical exam she has diffuse swelling of her right hand extending into her forearm with delayed cap refill, skin changes however still has a palpable pulse.  There is isolated swelling to the dorsal aspect of the left hand to a lesser extent compared to the right.  After my evaluation, Ortho hand was consulted for concern of compartment syndrome who evaluated the patient and thought less concern for compartment syndrome, would like to see her again later in the evening however no surgical intervention currently.  I did discuss with both the on-call immunologist and her primary immunologist extensively.  Initially we gave a dose of Berinert.  2 hours after this dose she did not have significant improvement unfortunately there were no additional doses located at [facility].  Our pharmacist was able to get contact with [facility], [facility] in [facility] none of which had this medication.  Unfortunately we were unable to find a substitute.  Initially patient was declining icatibant as she takes this at home and is no longer having significant improvement.  Ultimately she discussed this with the immunology fellow and was agreeable to take this medication.  First dose ordered in the ED.  Discussed with admissions coordinator as they feel that since patient has not had any significant improvement she should be monitored.  Admitted  to medicine in stable condition.  ----     Social Determinants of Health which Significantly Impact Care: None identified     EKG Independent Interpretation: EKG not obtained    Independent Result Review and Interpretation: Relevant laboratory and radiographic results were reviewed and independently interpreted by myself.  As necessary, they are commented on in the ED Course.    Chronic conditions affecting the patient's care: As documented above in [facility]    The patient was discussed with the following consultants/services:  ortho hand and allergy/immunology    Care Considerations: As documented above in [facility]    ED Course:  ED Course as of 02/22/25 2209   Sat Feb 22, 2025   1452 Page placed for allergy/immunology  [AW]   2246 Page sent for ortho [AW]   2751 Attending summary:  20 y/o F with PMHx hereditary angioedema presenting with 1 day of atraumatic hand swelling, R > L. Reports decreased sensation and range of motion and discoloration. R hand dominant. Never had similar symptoms before. No airway symptoms today. Vitals unremarkable. Exam shows nontoxic appearing female, no resp distress and no signs of airway compromise. Pictures are in the media tab but she has significant edema to dorsal R hand that is firm, all digits are flexed with areas of white discoloration. Cap refill 4 seconds. She has distal sensation to the hand and all 5 digits but is decreased. 2+ radial pulse. Has less significant area of edema to L dorsal hand, no discoloration, 2+ L radial pulse.    Concern for atraumatic compartment syndrome 2/2 hereditary angioedema. Will work on getting in contact with her immunologist or immunologist on call, and also consult hand/ortho for concern for compartment syndrome. Will get basic pre-op labs. Anticipate admission for monitoring of her symptoms and management by immunology.  [SS]   1685 Spoke with resident on ortho, recommending hand consult. Message sent to resident on call [AW]   9127 Spoke with  allergy fellow,  [AW]   1553 Fellow requesting 20 mg/kg of Berinert, order has been placed [AW]   2019 Patient did not have any improvement after her first dose.  Per her immunologist I did order 1000 unit repeat dosing however that I was informed by pharmacy that we do not have any vials left.  They did attempt to [facility] none of which you have any medication.  I did relay this to patient's immunologist [AW]   2208 Per immunology should use 30 of icatabant. First dose given. Will consider 2 additional doses q8 as needed [AW]      ED Course User Index  [AW] Stefanie Gordon DO  [SS] Luz Marina Posadas MD         Diagnoses as of 02/22/25 2209   Hereditary angioedema (Multi)     Disposition   As a result of their workup, the patient will require admission to the hospital.  The patient was informed of her diagnosis.  The patient was given the opportunity to ask questions and I answered them. The patient agreed to be admitted to the hospital.    Procedures   Procedures    Patient seen and discussed with ED attending physician.    Stefanie Gordon DO  Emergency Medicine       Stefanie Gordon DO  Resident  02/22/25 2211

## 2025-02-22 NOTE — ED TRIAGE NOTES
Pt has hx of hereditary angioedema. Pt noted bilateral hands swelling this morning, R>L. Denies sensation of swelling airway. Resp equal unlabored, spo2 WNL, airway patent, handling secretions and speaking in complete sentences. Pt took orladeyo prior to arrival.

## 2025-02-23 VITALS
BODY MASS INDEX: 23.6 KG/M2 | OXYGEN SATURATION: 97 % | DIASTOLIC BLOOD PRESSURE: 78 MMHG | SYSTOLIC BLOOD PRESSURE: 123 MMHG | RESPIRATION RATE: 16 BRPM | HEIGHT: 61 IN | HEART RATE: 93 BPM | TEMPERATURE: 98.6 F | WEIGHT: 125 LBS

## 2025-02-23 PROCEDURE — 2500000001 HC RX 250 WO HCPCS SELF ADMINISTERED DRUGS (ALT 637 FOR MEDICARE OP): Mod: SE

## 2025-02-23 PROCEDURE — 2500000001 HC RX 250 WO HCPCS SELF ADMINISTERED DRUGS (ALT 637 FOR MEDICARE OP): Mod: JZ,SE

## 2025-02-23 PROCEDURE — 1210000001 HC SEMI-PRIVATE ROOM DAILY

## 2025-02-23 PROCEDURE — 99223 1ST HOSP IP/OBS HIGH 75: CPT

## 2025-02-23 RX ORDER — CETIRIZINE HYDROCHLORIDE 10 MG/1
10 TABLET ORAL DAILY
Status: DISCONTINUED | OUTPATIENT
Start: 2025-02-23 | End: 2025-02-24 | Stop reason: HOSPADM

## 2025-02-23 RX ORDER — FAMOTIDINE 20 MG/1
20 TABLET, FILM COATED ORAL 2 TIMES DAILY
Status: DISCONTINUED | OUTPATIENT
Start: 2025-02-23 | End: 2025-02-24 | Stop reason: HOSPADM

## 2025-02-23 RX ORDER — ICATIBANT 30 MG/3ML
30 INJECTION, SOLUTION SUBCUTANEOUS ONCE AS NEEDED
Status: DISCONTINUED | OUTPATIENT
Start: 2025-02-23 | End: 2025-02-24 | Stop reason: HOSPADM

## 2025-02-23 RX ORDER — SUMATRIPTAN SUCCINATE 50 MG/1
50 TABLET ORAL EVERY 2 HOUR PRN
Status: DISCONTINUED | OUTPATIENT
Start: 2025-02-23 | End: 2025-02-24 | Stop reason: HOSPADM

## 2025-02-23 RX ORDER — ONDANSETRON 4 MG/1
4 TABLET, ORALLY DISINTEGRATING ORAL EVERY 8 HOURS PRN
Status: DISCONTINUED | OUTPATIENT
Start: 2025-02-23 | End: 2025-02-24 | Stop reason: HOSPADM

## 2025-02-23 RX ADMIN — CETIRIZINE HYDROCHLORIDE 10 MG: 10 TABLET, FILM COATED ORAL at 08:48

## 2025-02-23 RX ADMIN — C1 ESTERASE INHIBITOR RECOMBINANT 3000 UNITS: 2100 INJECTION, POWDER, FOR SOLUTION INTRAVENOUS at 17:22

## 2025-02-23 RX ADMIN — ACETAMINOPHEN 650 MG: 325 TABLET ORAL at 05:16

## 2025-02-23 ASSESSMENT — COGNITIVE AND FUNCTIONAL STATUS - GENERAL
DAILY ACTIVITIY SCORE: 24
MOBILITY SCORE: 24

## 2025-02-23 ASSESSMENT — ACTIVITIES OF DAILY LIVING (ADL): LACK_OF_TRANSPORTATION: PATIENT DECLINED

## 2025-02-23 ASSESSMENT — PAIN SCALES - GENERAL
PAINLEVEL_OUTOF10: 0 - NO PAIN
PAINLEVEL_OUTOF10: 3

## 2025-02-23 ASSESSMENT — PAIN DESCRIPTION - LOCATION: LOCATION: HEAD

## 2025-02-23 NOTE — CARE PLAN
The patient's goals for the shift include relax    The clinical goals for the shift include pt will remain stable during the night    Pt met shift goals and had an uneventful night

## 2025-02-23 NOTE — HOSPITAL COURSE
Hospital Course:    Bree Wood is a 19 y.o. female with PMH of hereditary angioedema type 1, chronic urticaria, and headaches, who presents with swelling of her bilateral upper extremities R more than L.    Patient was admitted 2/22 evening. Orthopedic surgery were consulted regarding concern for compartment syndrome. They evaluated her and noted presentation was not suspicious for it. Allergy and immunology were consulted in the ED. They recommended giving a dose of  Icatibant and Berinert. The patient initially refused the Icatibant but eventually took both medication. She improved overnight but had residual swelling of her forearms 2/23. Allergy were called again. The patient had mentioned that they had prescriptions for Ruconest infusions she had kept at home in case she was ever admitted at a hospital without it. Allergy agreed that she should get it while admitted at [facility]. Patient's sister drove up with the medication and it was administered 2/23.    No orders to display     Pertinent Procedures While Admitted:  None    Med changes:   None    To-Do:   [ ] F/U with OP allergy/immunology specialist to determine new PPX medication as this was her 3rd admission in 2/2025.

## 2025-02-23 NOTE — PROGRESS NOTES
Bree Wood is a 19 y.o. female on day 1 of admission presenting with Hereditary angioedema (Multi).      Subjective   Patient seen today. Doing well with no new complaints. Tolerating orally and passing BM.      Objective     Last Recorded Vitals  /66   Pulse 84   Temp 36.4 °C (97.5 °F)   Resp 16   Wt 56.7 kg (125 lb)   SpO2 97%   Intake/Output last 3 Shifts:    Intake/Output Summary (Last 24 hours) at 2/23/2025 0714  Last data filed at 2/23/2025 0000  Gross per 24 hour   Intake 240 ml   Output --   Net 240 ml       Admission Weight  Weight: 56.7 kg (125 lb) (02/22/25 2251)    Daily Weight  02/22/25 : 56.7 kg (125 lb) (44%, Z= -0.15)*    * Growth percentiles are based on [facility] (Girls, 2-20 Years) data.  Image Results  ECG 12 lead  Sinus tachycardia  Biatrial enlargement  Nonspecific ST and T wave abnormality  Abnormal ECG  No previous ECGs available    See ED provider note for full interpretation and clinical correlation  Confirmed by Jenn Landis ([id number]) on 2/18/2025 4:14:04 AM      Physical Exam  Constitutional:       Appearance: Normal appearance.   Cardiovascular:      Rate and Rhythm: Normal rate and regular rhythm.      Pulses: Normal pulses.      Heart sounds: Normal heart sounds.   Pulmonary:      Effort: Pulmonary effort is normal. No respiratory distress.      Breath sounds: Normal breath sounds. No wheezing.   Abdominal:      General: Abdomen is flat. There is no distension.      Palpations: Abdomen is soft.      Tenderness: There is no abdominal tenderness.   Musculoskeletal:         General: Swelling present. No tenderness.      Comments: Bilateral forearm swelling with no vascular compromise   Skin:     Capillary Refill: Capillary refill takes less than 2 seconds.   Neurological:      Mental Status: She is alert.         Assessment & Plan    Bree oWod is a 19 y.o. female with PMH of hereditary angioedema type 1, chronic urticaria, and headaches, who presents with swelling of her  bilateral upper extremities R more than L. This is likely a HAE flare up. She has taken her Orladeyo today but did not take her icatibant. Allergy team recommended Berinert which was given in the ED but did not seem to have much effect after 2 hours. No additional doses available in the hospital. Unfortunately, most of her HAE medications are non formulary and are got through specialty pharmacy. Will have to reach out to them in the AM to sort out her medications while she is inpatient. Importantly she does not have any respiratory distress, no concern for airway compromise. There is also low concern for compartment syndrome at this point (ortho assessed in the ED). Since arrival to the floor, patient is virtually asymptomatic, feels that her swelling has improved. Will manage pain/symptomatic management until her medications can be arranged for.        Updates 2/23:  - Patient had icatibant and berinert in the ED with significant improvement.  - She mentioned she has IV rocunest at home in case she is in a hospital that does not have her medication. Allergy mentioned okay to be discharged from their end given improvement, but would be ideal if she got her Rocunest before she leaves.  - Will keep overnight.'  - Orthopedic surgery saw her noted no concern for compartment syndrome.       #Hereditary angioedema flare  -Takes Orladeyo 110mg daily  -Takes Icatibant (Firazyr) PRN   -S/p 1 dose of Berinert in ED and 1 dose of Icatibant.  -S/p morphine 4mg x2 in the ED for pain  -Can continue with Tylenol for pain   - Patient had icatibant and berinert in the ED with significant improvement.  - She mentioned she has IV rocunest at home in case she is in a hospital that does not have her medication. Allergy mentioned okay to be discharged from their end given improvement, but would be ideal if she got her Rocunest before she leaves.  - Will keep overnight for one more day.      #Hx of migraine  -no complaints of migraine  headache at this time  -Her Nurtec and Relpax are non formularly  -Placed order for sumatriptan prn for migraine abortive therapy       #Misc  -Continue famotidine  -Continue zofran prn for nausea  -Continue Cetirizine        F: prn  E: prn  N: regular diet  A: piv     FULL CODE     Tiff Serrano (Parent)  [phone] (Mobile)      Socorro Garland MD

## 2025-02-23 NOTE — CARE PLAN
The patient's goals for the shift include relax    The clinical goals for the shift include Pt to be free from falls        Problem: Pain - Adult  Goal: Verbalizes/displays adequate comfort level or baseline comfort level  Outcome: Progressing     Problem: Pain  Goal: Takes deep breaths with improved pain control throughout the shift  Outcome: Progressing  Goal: Turns in bed with improved pain control throughout the shift  Outcome: Progressing  Goal: Walks with improved pain control throughout the shift  Outcome: Progressing  Goal: Performs ADL's with improved pain control throughout shift  Outcome: Progressing  Goal: Participates in PT with improved pain control throughout the shift  Outcome: Progressing  Goal: Free from opioid side effects throughout the shift  Outcome: Progressing  Goal: Free from acute confusion related to pain meds throughout the shift  Outcome: Progressing     Problem: Fall/Injury  Goal: Not fall by end of shift  Outcome: Progressing  Goal: Be free from injury by end of the shift  Outcome: Progressing  Goal: Verbalize understanding of personal risk factors for fall in the hospital  Outcome: Progressing  Goal: Verbalize understanding of risk factor reduction measures to prevent injury from fall in the home  Outcome: Progressing  Goal: Use assistive devices by end of the shift  Outcome: Progressing  Goal: Pace activities to prevent fatigue by end of the shift  Outcome: Progressing

## 2025-02-23 NOTE — PROGRESS NOTES
02/23/25 1426   Discharge Planning   Living Arrangements Other (Comment)  (lives with older sister)   Support Systems Parent;Family members   Assistance Needed none   Type of Residence Private residence   Number of Stairs to Enter Residence 0   Number of Stairs Within Residence 0   Do you have animals or pets at home? No   Who is requesting discharge planning? Provider   Home or Post Acute Services None   Expected Discharge Disposition Home   Does the patient need discharge transport arranged? No   Financial Resource Strain   How hard is it for you to pay for the very basics like food, housing, medical care, and heating? Pt Declined   Housing Stability   In the last 12 months, was there a time when you were not able to pay the mortgage or rent on time? Pt Declined   In the past 12 months, how many times have you moved where you were living? 0   At any time in the past 12 months, were you homeless or living in a shelter (including now)? Pt Declined   Transportation Needs   In the past 12 months, has lack of transportation kept you from medical appointments or from getting medications? Pt Declined   In the past 12 months, has lack of transportation kept you from meetings, work, or from getting things needed for daily living? Pt Declined   Patient Choice   Provider Choice list and CMS website (https://medicare.gov/care-compare#search) for post-acute Quality and Resource Measure Data were provided and reviewed with:   (n/a)   Patient / Family choosing to utilize agency / facility established prior to hospitalization No   Stroke Family Assessment   Stroke Family Assessment Needed No   Intensity of Service   Intensity of Service 0-30 min     Patient contacted by hospital phone. She endorses living with her older sister and having family assistance as needed from parents and family. Pt states she does not have any medical devices in her home, just has IV medications on-hand for her condition. Her PCP is Dr Holbrook but  hasn't seen them in awhile and preferred pharmacy is the [facility] in [place]. She states she does not need anything arranged or transportation at discharge. Her mother or sister will drive her home and she will have the help she needs.    Pt expressed frustration of being in a hospital. She states she would really like to go home today if possible. Her sister was driving up to visit today and was planning to bring her specialty medication with her if it was needed.    Macy Blount RN  (Weekend Transitional Care Coordinator-[facility], send Epic message)

## 2025-02-23 NOTE — SIGNIFICANT EVENT
Orthopaedic Significant Event Note    Patient seen and examine at bedside.    Exam of bilateral hands showing swelling right greater than left, but soft and compressible compartments throughout bilateral hands. States improved from when first seen. No pain with passive stretch of digits or wrist. Motor and sensation intact in median ulnar radial distributions. Capillary refill less than 2 seconds in all digits. Palpable radial pulse.     Low c/f compartment syndrome at this time. Ortho to continue to follow.    Adelia Myles MD, PGY-2  Orthopaedic Surgery  On-call: [phone]  Epic Chat Preferred

## 2025-02-23 NOTE — DISCHARGE INSTRUCTIONS
Dear Bree Wood,    You were admitted to [facility] for hand swelling. You presented to the hospital with swelling of both of your hands. You were evaluated in the ED and were found to be in an angioedema flare-up. You were given Icatibant and Berinert which are medication to help manage this condition. Orthopedic surgery were involved in your care to make sure nothing more dangerous was going on with your arms. They agreed with out diagnosis of a flare-up. The allergy and immunology team were also involved in your care. They had initially recommended the medication you got in the ED. They also recommended you bring in your infusion you had at home for situations such as these. You were given the medication with no issue. You improved significantly until you were ready for discharge. Pediatric allergy and immunology will call you to schedule an appointment as soon as possible.     Please take your medication as prescribed. Please follow-up with your appointments as scheduled.      Appointments/Follow-Up:     Please call and schedule appointment with your primary care doctor within 2 week, tell them that patient was recently admitted to the hospital.   If you need to establish with new primary care doctor, please call and schedule an appointment with [facility]: phone: [phone]      It was a pleasure taking care of you,  Your  Care Team

## 2025-02-23 NOTE — SIGNIFICANT EVENT
Patient seen and examine at bedside.     Exam of bilateral hands with swelling significantly improved overnight. Compartments soft and compressible. No pain with passive stretch of digits or wrist. Motor and sensation intact in median ulnar radial distributions. Capillary refill less than 2 seconds in all digits. Palpable radial pulse.      Low c/f compartment syndrome at this time. Ortho hand will sign off. Please reach out with questions or concerns.    Omar Quach MD  Orthopedic surgery PGY-4

## 2025-02-23 NOTE — H&P
History Of Present Illness  Bree Wood is a 19 y.o. female with PMH of hereditary angioedema type 1, chronic urticaria, and headaches, who presents to the ED today with concerns of hand swelling. Although she has had similar episodes in the past related to hand swelling, this episode seems to be worse. She described that the sensation of swelling has extended from her arm to her wrist and having some decreased sensation. She denies paresthesia's and denies any pain. Pulses are palpable bilaterally.     She is on Orladeyo daily, which she took today. She has icatibant at home but she did not take that today and did not bring it in. Patient was unable to get in touch her her immunologist. She has been to the ED very frequently for such episodes in the past few months. Important to note that she does not exhibit any respiratory distress or compromise. She denies getting intubated in the past.    Patient denies fevers, chills, headaches, sob, chest pain, nausea, vomiting, abdominal pain, urinary sx or change in bowel movements.       Past Medical History  She has a past medical history of Hereditary angio-edema.    Surgical History  She has no past surgical history on file.     Social History  She reports that she has never smoked. She has never used smokeless tobacco. She reports current drug use. Drug: Marijuana. She reports that she does not drink alcohol.    Family History  No family history on file.     Allergies  Sulfa (sulfonamide antibiotics)    Review of Systems     Physical Exam    General: Awake, alert, in no acute distress  Eyes: Gaze conjugate.  No scleral icterus or injection  HENT: Normo-cephalic, atraumatic. No stridor  CV: Regular rate, regular rhythm. Radial pulses 2+ bilaterally  PULM: Breathing non-labored, speaking in full sentences.  Clear to auscultation bilaterally  GI: Soft, non-distended, non-tender. No rebound or guarding.  MSK/Extremities: Severe swelling of the right greater than left  hands.  The right hand swells up into the mid forearm with obvious color difference.  Decreased sensation.  Deformity of the finger secondary to edema with limited range of motion of the fingers.  2+ bilateral pulses.  Delayed capillary refill  Skin: Warm. Appropriate color  Neuro: Alert. Oriented. Face symmetric. Speech is fluent.  Gross strength and sensation intact in b/l UE and LEs  Psych: Appropriate mood and affect       Last Recorded Vitals  /81 (BP Location: Right arm, Patient Position: Sitting)   Pulse 79   Temp 37.1 °C (98.8 °F) (Temporal)   Resp 16   Wt 56.7 kg (125 lb)   SpO2 99%     Relevant Results  Results for orders placed or performed during the hospital encounter of 02/22/25 (from the past 24 hours)   CBC and Auto Differential   Result Value Ref Range    WBC 9.5 4.4 - 11.3 x10*3/uL    nRBC 0.0 0.0 - 0.0 /100 WBCs    RBC 4.46 4.00 - 5.20 x10*6/uL    Hemoglobin 14.2 12.0 - 16.0 g/dL    Hematocrit 38.8 36.0 - 46.0 %    MCV 87 80 - 100 fL    MCH 31.8 26.0 - 34.0 pg    MCHC 36.6 (H) 32.0 - 36.0 g/dL    RDW 11.5 11.5 - 14.5 %    Platelets 233 150 - 450 x10*3/uL    Neutrophils % 24.3 40.0 - 80.0 %    Immature Granulocytes %, Automated 0.2 0.0 - 0.9 %    Lymphocytes % 71.3 13.0 - 44.0 %    Monocytes % 3.0 2.0 - 10.0 %    Eosinophils % 0.0 0.0 - 6.0 %    Basophils % 1.2 0.0 - 2.0 %    Neutrophils Absolute 2.30 1.20 - 7.70 x10*3/uL    Immature Granulocytes Absolute, Automated 0.02 0.00 - 0.70 x10*3/uL    Lymphocytes Absolute 6.74 (H) 1.20 - 4.80 x10*3/uL    Monocytes Absolute 0.28 0.10 - 1.00 x10*3/uL    Eosinophils Absolute 0.00 0.00 - 0.70 x10*3/uL    Basophils Absolute 0.11 (H) 0.00 - 0.10 x10*3/uL   Basic metabolic panel   Result Value Ref Range    Glucose 121 (H) 74 - 99 mg/dL    Sodium 139 136 - 145 mmol/L    Potassium 3.5 3.5 - 5.3 mmol/L    Chloride 105 98 - 107 mmol/L    Bicarbonate 24 21 - 32 mmol/L    Anion Gap 14 10 - 20 mmol/L    Urea Nitrogen 9 6 - 23 mg/dL    Creatinine 0.58 0.50 -  1.05 mg/dL    eGFR >90 >60 mL/min/1.73m*2    Calcium 8.7 8.6 - 10.6 mg/dL   Protime-INR   Result Value Ref Range    Protime 10.6 9.8 - 12.8 seconds    INR 0.9 0.9 - 1.1   APTT   Result Value Ref Range    aPTT 24 (L) 27 - 38 seconds   Type And Screen   Result Value Ref Range    ABO TYPE O     Rh TYPE POS     ANTIBODY SCREEN NEG    hCG, quantitative, pregnancy   Result Value Ref Range    HCG, Beta-Quantitative <3 <5 mIU/mL   Morphology   Result Value Ref Range    RBC Morphology No significant RBC morphology present             Assessment/Plan   Assessment & Plan  Hereditary angioedema (Multi)    Bree Wood is a 19 y.o. female with PMH of hereditary angioedema type 1, chronic urticaria, and headaches, who presents with swelling of her bilateral upper extremities R more than L. This is likely a HAE flare up. She has taken her Orladeyo today but did not take her icatibant. Allergy team recommended Berinert which was given in the ED but did not seem to have much effect after 2 hours. No additional doses available in the hospital. Unfortunately, most of her HAE medications are non formulary and are got through specialty pharmacy. Will have to reach out to them in the AM to sort out her medications while she is inpatient. Importantly she does not have any respiratory distress, no concern for airway compromise. There is also low concern for compartment syndrome at this point (ortho assessed in the ED). Since arrival to the floor, patient is virtually asymptomatic, feels that her swelling has improved. Will manage pain/symptomatic management until her medications can be arranged for.       #Hereditary angioedema flare  -Takes Orladeyo 110mg daily  -Takes Icatibant (Firazyr) PRN   -Consult with allergy team/patient's primary allergist Dr. Aniceto Calvin  -Reach out to specialty pharmacy to get her medications  -S/p 1 dose of Berinert in ED  -S/p morphine 4mg x2 in the ED for pain  -Can continue with Tylenol for pain   -Since  arrival to the floor, she does not have any complaints, no pain  -Her swelling has slightly improved, is able to move her arms/hands more freely    #Hx of migraine  -no complaints of migraine headache at this time  -Her Nurtec and Relpax are non formularly  -Placed order for sumatriptan prn for migraine abortive therapy    #Misc  -Continue famotidine  -Continue zofran prn for nausea  -Continue Cetirizine      F: prn  E: prn  N: regular diet  A: piv    FULL CODE      Tiff Serrano (Parent)  [phone] ([place]         Ana Santa MD

## 2025-02-24 ENCOUNTER — SPECIALTY PHARMACY (OUTPATIENT)
Dept: PHARMACY | Facility: CLINIC | Age: 20
End: 2025-02-24

## 2025-02-24 ENCOUNTER — TELEMEDICINE (OUTPATIENT)
Dept: ALLERGY | Facility: CLINIC | Age: 20
End: 2025-02-24
Payer: COMMERCIAL

## 2025-02-24 DIAGNOSIS — L50.8 CHRONIC URTICARIA: ICD-10-CM

## 2025-02-24 DIAGNOSIS — R51.9 FREQUENT HEADACHES: ICD-10-CM

## 2025-02-24 DIAGNOSIS — R60.9 SWELLING: ICD-10-CM

## 2025-02-24 DIAGNOSIS — L29.9 PRURITUS: ICD-10-CM

## 2025-02-24 DIAGNOSIS — D84.1: ICD-10-CM

## 2025-02-24 DIAGNOSIS — D84.1 HEREDITARY ANGIOEDEMA (MULTI): Primary | ICD-10-CM

## 2025-02-24 PROBLEM — T78.3XXA ANGIOEDEMA: Status: ACTIVE | Noted: 2025-02-24

## 2025-02-24 PROCEDURE — 99215 OFFICE O/P EST HI 40 MIN: CPT | Performed by: STUDENT IN AN ORGANIZED HEALTH CARE EDUCATION/TRAINING PROGRAM

## 2025-02-24 PROCEDURE — 1036F TOBACCO NON-USER: CPT | Performed by: STUDENT IN AN ORGANIZED HEALTH CARE EDUCATION/TRAINING PROGRAM

## 2025-02-24 PROCEDURE — RXMED WILLOW AMBULATORY MEDICATION CHARGE

## 2025-02-24 RX ORDER — C1 ESTERASE INHIBITOR 500(10 ML)
1000 VIAL (EA) INTRAVENOUS ONCE AS NEEDED
Qty: 2 EACH | Refills: 3 | Status: SHIPPED | OUTPATIENT
Start: 2025-02-24 | End: 2025-02-25 | Stop reason: SDUPTHER

## 2025-02-24 NOTE — SIGNIFICANT EVENT
Bree Wood is a 19 y.o. year old female patient admitted to [facility] on 02/23/25 for flare of Hereditary Angioedema    I was called at bedside since patient was requesting to be discharged tonight. I explained to the patient as a night acting physician, I do not perform discharges except only in rare, exceptional circumstances. Furthermore, I explained that as I do not know her case well, since I do not participate in day team medical decision making conversations, this would open the door for medical errors on my part. This would include, but not limited to, errors in discharge instructions and incorrect medications ordered/prescribed to pharmacy, etc.     Patient still was adamant about wanting to leave against my medical advice. I discussed that the plan relayed from the primary team was to keep the patient overnight for monitoring to see full resolution of the swelling or worsening of symptoms while on Ruconest. Also discussed that this medication was started relatively soon and would want to see her response in patient. She was still adamant about wanting to leave against medical advice tonight. She understood why she initially was admitted into the hospital (flare of angioedema). Was able to communicate to me why she was meant to stay in the hospital for monitoring. She understood the risks of leaving the hospital. Voiced that if the swelling worsens or has another flare, she will go to the emergency department. She also voiced that she will schedule an allergy and immunology appointment sooner to discuss further treatment options. I also stressed to patient and family that the disease process is like unknown. She relayed that this started at age 16, with swelling in her hands. Now she has swelling associated with feet and gastrointestinal tract. I expressed that in the event there is upper airway swelling to immediately go to the emergency department for airway watch. Patient's mother  "inquiring what would happen in that scenario in which I explained intubation may be required for airway protection, if it gets there. Patient and family member were inquired about further questions, all concerns addressed.     Patient requested one last dose of Ruconest prior to leaving. After discussing with the pharmacist on call, it was best not to administer a second dose. Current literature guidelines for Ruconest show that it is not administered a second time in a 24-hour period unless there is worsening of the swelling, which was not applicable here. Patient voiced understanding. Last vitals obtained 2100 within normal limits. Examination notable for swelling in both hands, with more swelling noticed on the left. Photographs taken for documentation purposes (uploaded to chart) and monitoring of response to medications. Lastly, I requested the patient's nurse to remove intravenous access and to sign the necessary paperwork.     Vitals:   Blood pressure 123/78, pulse 93, temperature 37 °C (98.6 °F), resp. rate 16, height 1.541 m (5' 0.67\"), weight 56.7 kg (125 lb), SpO2 97%.     Physical Exam  Constitutional:       Appearance: Normal appearance. She is well-developed and normal weight.   Pulmonary:      Effort: Pulmonary effort is normal. No tachypnea or bradypnea.   Musculoskeletal:      Right hand: Swelling present. No deformity or tenderness. Normal strength.      Left hand: Swelling present. No deformity or tenderness. Normal strength.      Comments: There is some swelling noticed in both hands, left more than right. Review of photos compared to admission does show improvement. Intravenous line in place in left antecubital fossa. There is no visible rash or ecchymosis.    Neurological:      Mental Status: She is alert and oriented to person, place, and time.           Keegan Sneed MD  PGY-1, Department of Medicine  [facility]   "

## 2025-02-24 NOTE — PROGRESS NOTES
PREFERRED CONTACT INFORMATION  Telephone: [phone]   Email: [email or web address]     HISTORY OF PRESENT ILLNESS  Bree Wood is a 19 y.o. female with PMH of hereditary angioedema type 1, chronic urticaria, and headaches, who presents today for a virtual follow-up visit.    Hereditary angioedema  Interim history  - Bree's last visit was in 9/2024 but since then she has had several flare-ups, requiring ED visits and admission for the past 2 days, very resistant to treatment. On 2/11 she had been having symptoms compatible with a possible viral infection since 2/9, with fever, headache, nasal discharge, and dry throat. In the setting of this she had a flare-up of her HAE, with swelling of her feet, abdominal pain, nausea, and vomiting. She used one dose of her PRN medication - icatibant (Firazyr) on 2/9 and then a second dosage on 2/10 at 8AM, with some improvement, but still presence of swelling. We recommended a third dose after 2PM on 2/10 if still symptomatic, but also discussed ED visit if refractory to that or any symptom worsening. During the rest of the day yesterday Bree had worsening of her abdominal symptoms, with increased abdominal pain and vomiting so she went to the [facility], taking her rescue IV C1 inhibitor product - Ruconest - with her. Discussed her condition with the ED team and recommended administering dose of Ruconest - Bree's dosage is 3000 units (20 mL of a 150 U/mL after reconstitution of two vials with 14 mL each) slowly IV over approximately 5 minutes for acute attack of HAE. If still symptomatic may use a second dose of 3000 units 2-4 hours later. She received two dosages with improvement and went home. New flare-up on 2/17 where she went to  Main ED due to persistence of abdominal pain, had CT scan that showed intestinal edema and was treated with 1000 units IV of Berinert, with symptoms improving 2 hours later. On 2/22 she presented again with  "swelling, this time considerable swelling of her upper extremities, treated with 1500 units of IV Berinert with some improvement, but without full resolution, recommended a second dosage, but not available at [facility], kept overnight for monitoring with some mild improvement, no more medication administered so she left AMA on 2/23/2025 at nighttime. Today doing better, her symptoms have improved.    History  - I was called by inpatient team on 7/13 for a consult on Bree due to angioedema, with reported possible family history of HAE. Family left before I could see Bree, but I asked primary team to please send C4 levels, as well as C1 esterase inhibitor quantity and function levels. C4 borderline low, C1 esterase inhibitor quantity low at 12, function equivocal. From an history standpoint Bree woke up with a swollen arm, forearm, and that lasted around 24 hours until going to the ED. Never went for a surgery. No episodes of nausea, vomiting, or abdominal pain, but there is in the chart a visit from 2016 to the ED for abdominal pain. Her father has swelling of no apparent cause, his daughter and his daughter's son have it as well. Dad had several episodes of severe angioedema, including laryngeal swelling with multiple ED visits and sensation of throat closure. Repeat labs on 10/7 with low C4, low C1 quantity and function, normal C1q, history and repeat laboratorial evaluation compatible with HAE-C1-INH type 1. Discussed with Bree's local ED ([facility]) availability of C1 inhibitor in case she has an acute attack. [facility] ED is now stocked with 4x500 units of Berinert, enough for two administrations, if repeat administration is required for Bree in case of no response to initial administration.  - 6/2022: \"For on-demand therapy Berinert initially denied by insurance, with preference for Ruconest. We submitted PA with Ruconest approved - Bree has 3000 units (20 mL) for acute HAE attack PRN, " "slowly IV over 5 minutes. For prophylaxis, had [id number] with insurance on 12/16/2021, with approval of berotralstat 110 mg daily for 6 months, if evidence of reduction may renew for additional 12 months, started medication in 11/2021 with quick start program from company initially. Clinically she has been doing well, no episodes of angioedema since last visit. She has had headaches since prior to starting her HAE medications and tried propranolol for migraine prophylaxis, but the symptoms did not improve, still having frequent symptoms that she uses PRN motrin/aleve for. Also having very long periods, will see OBGyn soon. She has a rash that occurs in her upper chest occasionally, non-pruritic, non-burning, will send pictures so we can better characterize it. CMP on 12/30/2021 without major abnormalities, normal LFTs.\"  - 6/2023: \"Last visit in 5/2023, since then no new episodes of HAE.\"  - 12/2023: \"Since last visit she had been doing well. 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 Discussed indication for Ruconest with ED team - Bree brought 1 vial of Ruconest with her - her does requires a partial second vial, so this will be slightly underdosed, but still worth pursuing. If Bree's symptoms do not improve, worsen, or return, recommended her to take 2 vials to closer ED and/or go to main ED at  or [facility] where C1 esterase inhibitor should be available. Since then she improved, almost resolved.\"  - 2/2024: \"Bree's sisters called on 2/10 as she had been experiencing abdominal pain for 1-2 days accompanied by frequent vomiting and some diarrhea. No one else sick in the family, no clear food etiology, and has not developed fever. No swelling noted in any specific locations of her body. Bree's differential included the usual causes of abdominal pain, vomiting, and diarrhea, including " "infectious gastroenteritis, but in the setting of her known diagnosis of hereditary angioedema her symptoms may also be happening in setting of an acute abdominal attack due to intra-abdominal swelling. As Bree has now been approved for SC icatibant injections - placed order at last visit, recommended to patient and family to administer one 3 mL injection (30 mg) in her abdominal subcutaneous area to assess for improvement. If no improvement or symptoms recur, she may use additional injections in intervals of at least 6 hours for a maximum of 3 injections over 24 hours. We additionally discussed that if Bree developed worsening of her GI symptoms she might need regardless to be evaluated at an emergency department, same if not responding to the 3 sets of icatibant injections. If she developed swelling of any other areas of her body and not responding to icatibant she should also go to the ED as soon as possible. If she went to the ED with these symptoms we recommended obtaining CT abdomen to assess for marked swelling associated with HAE attack, as that might justify additional need for treatment with some form of C1 esterase inhibitor. Per phone call with our team on 2/12, Bree used one dose of icatibant with good response. Last month had another episode of swelling when working out at the gym, took 2 days to improve, overall she has been very stressed and has noticed her swelling episodes have been increasing in that setting.\"  - 3/2024: \"Last seen on 2/14/2024 in the setting of multiple recent exacerbations. Since then she has had no additional HAE exacerbations or PRN medication needs. Still dealing with a lot of life stress, that has been affecting her headaches and as of late has notices increased sweating, both at night and during the day, that is not associated with clear fever.\"  - 4/2024: \"On last visit we continued regimen, but noted Bree had increased frequency of symptoms with use of " "icatibant. Since last visit she had another episode of abdominal pain/swelling 2 weeks ago, for each she used Firazyr, with improvement. She also been noticing milder swelling of her hand, feet, and wrist about once a month, usually resolving within 2 days. Episodes have been happening around once a month, but she notices some degree of abdominal swelling almost on a daily basis.\"  - 9/2024: \"Last visit in 4/2024, given increase in attacks, Bree was switched to subcutaneous lanadelumab 300 mg every 2 weeks, still with PRN icatibant. Since then she has been noticing more frequently since then, all over her body, face, chest, has not noticed any hives or swelling. Her itching only happens after she injects lanadelumab, lasts several days but affects her sleep. She also develops an headache. Her symptoms improve near the end of the 2 weeks and then restart afterwards. No break-outs requiring need for icatibant though. 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 Since then she has been using cetirizine PRN and symptoms have been mostly well controlled. On last visit we sent CBC w/ diff, CMP, and thyroid function, all normal.     History  - Since last year she has had almost daily episodes of hives on her arms, neck, chest, and abdominal areas. Tried Benadryl a few times with mild improvement, but has not yet used long-acting anti-histamine agents.   - 6/2023: \"Labs sent on last visit had anti-IgE receptor antibody borderline close to positive, other urticaria labs with normal CBC, CMP, and normal thyroid function. On last visit recommended starting cetirizine 10 mg daily, that could be increased to BID if still symptomatic. Since then she is still taking it as needed, but overall " "notices that it helps when she has hives. Around 2-3 episodes total since last visit.\"    Food Allergy  No    Eczema/ Atopic Dermatitis  No    Asthma  No    Rhinoconjunctivitis  No    Drug Allergy   Sulfa - rash    Insect Allergy   No    Infections  No history of frequent or recurrent infections     FAMILY HISTORY  Her father has hereditary angioedema, so does his other daughter and his daughter's son has it as well. Dad had several episodes of severe angioedema, including severe laryngeal swelling. Bree connected with that side of the family and told at least the younger members are on prophylaxis.    SOCIAL/ENVIRONMENTAL HISTORY  Lives with her sister.  Occupation - works at a restaurant    ALLERGIES  Allergies   Allergen Reactions    Sulfa (Sulfonamide Antibiotics) Unknown and Hives     MEDICATIONS  Current Outpatient Medications on File Prior to Visit   Medication Sig Dispense Refill    cetirizine (ZyrTEC) 10 mg tablet Take 1 tablet (10 mg) by mouth once daily. May increase to 10 mg twice a day or even 20 mg twice a day if still having breakouts 60 tablet 0    eletriptan (Relpax) 40 mg tablet Take 1 tablet (40 mg) by mouth 1 time if needed for migraine (at onset of headache). May repeat in 2 hours if unresolved. Do not exceed 80 mg in 24 hours.      famotidine (Pepcid) 20 mg tablet Take 1 tablet (20 mg) by mouth 2 times a day. 120 tablet 0    fexofenadine (Allegra) 60 mg tablet Take 1 tablet (60 mg) by mouth 2 times a day.      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 Inject under the skin in the abdominal area. If no response or symptoms recur, additional injections may be administered at intervals of at least 6 hours - not to exceed more than 3 injections in 24 hours. 9 mL 1    norethindrone ac-eth estradioL (Loestrin 1/20, 21,) 1-20 mg-mcg tablet Take 1 tablet by mouth once daily.      " norethindrone-e.estradioL-iron (Microgestin FE 1.5/30) 1.5 mg-30 mcg (21)/75 mg (7) tablet Take 1 tablet by mouth once daily. 90 tablet 3    ondansetron ODT (Zofran-ODT) 4 mg disintegrating tablet Take 1 tablet (4 mg) by mouth every 8 hours if needed for nausea or vomiting. 30 tablet 0    Orladeyo capsule Take 1 capsule (110 mg) by mouth once daily.      rimegepant (Nurtec ODT) 75 mg tablet,disintegrating Take 1 tablet (75 mg) by mouth 1 time if needed (at onset of headache).      [DISCONTINUED] doxycycline (Vibramycin) 100 mg capsule Take 1 capsule (100 mg) by mouth 2 times a day for 10 days. Take with at least 8 ounces (large glass) of water, do not lie down for 30 minutes after 20 capsule 0    [DISCONTINUED] lanadelumab (Takhzyro) 300 mg/2 mL (150 mg/mL) injection Inject 2 mL (300 mg) under the skin every 14 (fourteen) days. 4 mL 11    [DISCONTINUED] ondansetron ODT (Zofran-ODT) 4 mg disintegrating tablet Dissolve 1 tablet (4 mg) in the mouth every 8 hours if needed for nausea or vomiting for up to 7 days. 20 tablet 0     Current Facility-Administered Medications on File Prior to Visit   Medication Dose Route Frequency Provider Last Rate Last Admin    [COMPLETED] c1 esterase inhibitor (recomb) (Ruconest) injection 3,000 Units  3,000 Units intravenous Once Socorro Garland MD   3,000 Units at 02/23/25 1722    [DISCONTINUED] acetaminophen (Tylenol) tablet 650 mg  650 mg oral q6h PRN Ana Santa MD   650 mg at 02/23/25 0516    [DISCONTINUED] c1 esterase inhibitor (recomb) (Ruconest) injection 3,000 Units  3,000 Units intravenous Once Keegan Sneed MD        [DISCONTINUED] cetirizine (ZyrTEC) tablet 10 mg  10 mg oral Daily Ana Santa MD   10 mg at 02/23/25 0848    [DISCONTINUED] famotidine (Pepcid) tablet 20 mg  20 mg oral BID Ana Santa MD        [DISCONTINUED] icatibant (Firazyr) injection 30 mg  30 mg subcutaneous Once PRN Ana Santa MD        [DISCONTINUED] ondansetron ODT (Zofran-ODT)  "disintegrating tablet 4 mg  4 mg oral q8h PRN Ana Santa MD        [DISCONTINUED] SUMAtriptan (Imitrex) tablet 50 mg  50 mg oral q2h PRN Ana Santa MD         REVIEW OF SYSTEMS  Pertinent positives and negatives have been assessed in the HPI. All other systems have been reviewed and are negative except as noted in the HPI.    PHYSICAL EXAMINATION   There were no vitals taken for this visit.    Constitutional: no acute distress and well developed  Ears/Nose/Mouth/Throat: oropharynx pink and moist, anicteric bilaterally  Respiratory: normal respiratory effort  Neuro: awake, alert, answers appropriately    LABS / TESTS  Skin Tests results from 2/24/2025   None    CBC w/ diff absolute eosinophils   Eosinophils Absolute   Date Value Ref Range Status   02/22/2025 0.00 0.00 - 0.70 x10*3/uL Final   02/11/2025 0.04 0.00 - 0.70 x10*3/uL Final   10/22/2024 0.01 0.00 - 0.70 x10*3/uL Final     Eosinophils Absolute, Manual   Date Value Ref Range Status   02/17/2025 0.00 0.00 - 0.70 x10*3/uL Final      Environmental serum IgE (specifics)   No results found for: \"ICIGE\", \"WHITEASH\", \"SILVERBIRCH\", \"BOXELDER\", \"MOUNTJUNIPER\", \"COTTONWOOD\", \"ELM\", \"MULBERRY\", \"PECANHICKORY\", \"MAPLESYCAMOR\", \"OAK\", \"BERMUDAGR\", \"JOHNSONGR\", \"BLUEGRASS\", \"TIMOTHYGRASS\", \"SWTVERNAL\"  No results found for: \"LAMBQUART\", \"PIGWEED\", \"COMRAGWEED\", \"RUSSIANT\", \"SHEEPSOR\", \"PLANTAIN\", \"CATEPI\", \"DOGEPI\", \"MOUSEEPI\", \"ALTERNA\", \"CLADHERB\", \"ICA04\", \"PENICILLIUM\", \"DERMFAR\", \"DERMPTE\", \"COCKR\"    ASSESSMENT & PLAN  Bree Wood is a 19 y.o. female with PMH of hereditary angioedema type 1, chronic urticaria, and headaches, who presents today for a virtual follow-up visit.    1. Hereditary angioedema type 1 (CMS/HCC) / Angioedema  Bree's personal and family history was highly suggestive of HAE, with initial and repeat labs with low C4, low C1 quantity and function, normal C1q, history and repeat laboratorial evaluation compatible with HAE-C1-INH type " 1. This condition is potentially life-threatening in the absence of treatment if the laryngeal area is involved during an attack. Had been attack-free for around one year, but then requiring PRN HAE medications 3 times since 12/2023, that was being triggered mostly by stress and life events, as her medications had not yet changed, though Bree addresses difficulty with full compliance. Episodes have responded promptly to first dose of icatibant, but she continued to have symptoms, then with daily abdominal swelling/pain, and 1-2x/week wrist arm swelling, so not well controlled, and switched to subcutaneous lanadelumab, that she did not fully tolerate from a side effect standpoint, went back on Orladeyo and PRN icatibant, but now with 3 ED visits and 1 admission in the past 10 days with resistance to treatment, was also resistant to Ruconest.  - HAE etiology, pathogenesis, genetic implications, attack triggers, prophylactic and on-demand treatment discussed in detail with the patient. Letter explaining her condition and adequate emergency management created and sent to the patient. Letter written to employer regarding major efforts.  - After discussing several options, Bree we will retry lanadelumab  mg every 2 weeks as soon as she is able to start for prophylaxis.  - Will additionally switch her main PRN modality to IV Berinert 1000 U, and may receive a second dose if not yet responding to the initial dose in the ED after 2 dosages. Will also discuss with her local ED - [facility] - to attempt to create a local emergency plan and have available Berinert at that location in case of flare-ups.  - If possible would still  continue PRN icatibant 30 mg to use SC PRN, for a max of 3 doses in 24 hours, and 6 hours apart if symptoms still present. Discussed with patient if laryngeal attacks she will need to go to the ED right away after using the icatibant on her own.   - lanadelumab (Takhzyro) 300 mg/2 mL  (150 mg/mL) injection; Inject 2 mL (300 mg) under the skin every 14 (fourteen) days.  Dispense: 50 mL; Refill: 0  - C1 esterase inhibitor (Berinert) 500 unit (10 mL) recon soln; Infuse 1,000 Units into a venous catheter 1 time if needed (Swelling due to hereditary angioedema) for up to 1 dose. To be given IV over 10 minutes. May need to give a second dose (30 minutes to 2 hours after first) if no response to the first dose.  Dispense: 2 each; Refill: 3     2. Chronic urticaria  History compatible with chronic urticaria, now better controlled. Borderline anti-IgE receptor antibody.  - We discussed comprehensively the etiology, natural course, prognosis, and management of chronic urticaria, with handouts given to the patient.  - Will continue cetirizine 10 mg PRN daily, that can be increased to BID if patient is still symptomatic.    Follow-up visit is recommended in 1-2 months (earlier if any HAE exacerbations)    This visit was completed via audio and visual technology. All issues as below were discussed and addressed and a limited physical exam within the constraints of the technology was performed. If it was felt that the patient should be evaluated in clinic then they were directed there. Verbal consent was requested and obtained from parent/guardian to provide this telehealth service on this date for a telehealth visit.    Aniceto Calvin MD

## 2025-02-24 NOTE — DISCHARGE SUMMARY
Discharge Diagnosis  Hereditary angioedema involving bilateral hands    Issues Requiring Follow-Up  -Outpt allergy/immunology specialist to determine new PPX medication as this was her 3rd admission in 2/2025.  -pcp    Discharge Meds     Medication List      CHANGE how you take these medications     ondansetron ODT 4 mg disintegrating tablet; Commonly known as:   Zofran-ODT; Take 1 tablet (4 mg) by mouth every 8 hours if needed for   nausea or vomiting.; What changed: Another medication with the same name   was removed. Continue taking this medication, and follow the directions   you see here.     CONTINUE taking these medications     cetirizine 10 mg tablet; Commonly known as: ZyrTEC; Take 1 tablet (10   mg) by mouth once daily. May increase to 10 mg twice a day or even 20 mg   twice a day if still having breakouts   eletriptan 40 mg tablet; Commonly known as: Relpax   famotidine 20 mg tablet; Commonly known as: Pepcid; Take 1 tablet (20   mg) by mouth 2 times a day.   fexofenadine 60 mg tablet; Commonly known as: Allegra   icatibant injection; Commonly known as: Firazyr; Inject 3 mL (30 mg)   under the skin 1 time if needed for swelling (Swelling due to hereditary   angioedema). Inject under the skin in the abdominal area. If no response   or symptoms recur, additional injections may be administered at intervals   of at least 6 hours - not to exceed more than 3 injections in 24 hours.   Loestrin 1/20 (21) 1-20 mg-mcg tablet; Generic drug: norethindrone   ac-eth estradioL   norethindrone-e.estradioL-iron 1.5 mg-30 mcg (21)/75 mg (7) tablet;   Commonly known as: Microgestin FE 1.5/30; Take 1 tablet by mouth once   daily.   Nurtec ODT 75 mg tablet,disintegrating; Generic drug: rimegepant   Orladeyo capsule; Generic drug: berotralstat     STOP taking these medications     doxycycline 100 mg capsule; Commonly known as: Vibramycin       Test Results Pending At Discharge  Pending Labs       No current pending labs.             Hospital Course  Bree Wood is a 19 y.o. female with PMH of hereditary angioedema type 1, chronic urticaria, and headaches, who presents with swelling of her bilateral upper extremities R more than L.    Patient was admitted 2/22 evening. Orthopedic surgery were consulted regarding concern for compartment syndrome. They evaluated her and noted presentation was not suspicious for it. Allergy and immunology were consulted in the ED. They recommended giving a dose of  Icatibant and Berinert. The patient initially refused the Icatibant but eventually took both medication. She improved overnight but had residual swelling of her forearms 2/23. Allergy were called again. The patient had mentioned that they had prescriptions for Ruconest infusions she had kept at home in case she was ever admitted at a hospital without it. Allergy agreed that she should get it while admitted at [facility]. Patient's sister drove up with the medication and it was administered 2/23.    No orders to display     Pertinent Procedures While Admitted:  None    Med changes:   None    Follow up:    [ ] F/U with Outpt allergy/immunology specialist to determine new PPX medication as this was her 3rd admission in 2/2025.      Pertinent Physical Exam At Time of Discharge  Pt left AMA 2/23 pm. I started 2/24 am and did not see pt     Outpatient Follow-Up  Future Appointments   Date Time Provider Department Center   3/17/2025  4:00 PM Aniceto Calvin MD [id number]         Madonna Pantoja MD    Attending:  Patient was seen and examined on 2/23 during morning rounds and my note is documented in her history and physical.  Patient left the hospital around 10 PM on 2/23 AGAINST MEDICAL ADVICE.

## 2025-02-24 NOTE — NURSING NOTE
Pt wanted to leave the hospital. This nurse let  dr. Emily Sneed know about the situation.  came to the beside and talked to the pt.  told me he already talked to pt about risk and benefit of leaving AMA. And told me to give her am AMA form and he would come momentarily. Come and sign the AMA form. This nurse gave pt the AMA form and returned her meds form [employer]. Pt signed the form and left hospital around 2155. This nurse informed everything to the dr. All conversation was done through secure chat.

## 2025-02-24 NOTE — PROGRESS NOTES
"Ankita contacted regarding refill delivery of Firazyr as recently administered prior to hospitalization. Had follow-up with allergist today who advised to continue this.     During this call she said she forgot to tell Dr. Calvin at her appointment today about the new pineapple gummies called \"Premium Feminine Balance Gummies\" she started taking. Ankita reported her swelling events happened the day after she took these. Per patient: took 2/8 (swelled 2/9), 2/14 (swelled 2/15), and 2/21 (swelled 2/22). Stated she was taking inconsistently due to GI symptoms she was having. Said she has since thrown these away and plans to follow up with OB-GYN at reported upcoming appointment. Allergist, Dr. Calvin, notified who agreed with discontinuation of gummies.    "

## 2025-02-24 NOTE — SIGNIFICANT EVENT
Bree Wood is a 19 y.o. year old female patient [facility] for flare of Hereditary Angioedema    I was called at bedside since patient was requesting to be discharged tonight. I explained to the patient as a night acting physician, I do not perform discharges except only in rare, exceptional circumstances. Furthermore, I explained that as I do not know her case well since I do not participate in day team medical decision making conversations, this would open the door for medical errors on my part. This would include, but not limited to, errors in discharge instructions and incorrect medications ordered/prescribed to pharmacy, etc.     Patient still was adamant about wanting to leave against my medical advice. I discussed that the plan relayed from the primary team was to keep the patient overnight for monitoring to see full resolution of the swelling or worsening of symptoms. Also discussed that this medication was started relatively soon and would want to see her response in patient. She was still adamant about wanting to leave against medical advice tonight. She understood why she initially was admitted into the hospital (flare of angioedema). Was able to communicate to me why she was meant to stay in the hospital for monitoring.             Unfortunately, this will not be possible. I am the night resident taking care of this patient and as a night team I do not discharge. Discharges only happen from night float/team only in exceptional circumstances or rare instances. I am also not familiar with this patient at all and I would have to prepare the whole discharge process including, but not limited to, discharge instructions and medication refills, etc. As I don't know this patient, it would open the door for medical errors on part when it comes to the process. From what I received at sign out from the day resident, the primary team had already explained that patient must  "stay overnight and then will be discharged tomorrow morning. The reasoning behind their rationale is to continue to monitor further swelling and treatment responses. The only possibility of her leaving is if she leaves against medical advice. This is of course not what I would want either. I apologize for this stringent plan, but this is what was agreed with the day team. Thank you.     Blood pressure 123/78, pulse 93, temperature 37 °C (98.6 °F), resp. rate 16, height 1.541 m (5' 0.67\"), weight 56.7 kg (125 lb), SpO2 97%.     Physical Exam       Keegan Sneed MD  PGY-1, Department of Medicine  [facility]     "           Keegan Sneed MD  PGY-1, Department of Medicine  [facility]

## 2025-02-24 NOTE — PATIENT INSTRUCTIONS
Thank you very much for visiting us today and allowing us to care of your health concerns, Bree. Given all your recent flare-ups and your recent admission, we will try to switch your prophylactic medication from the oral daily Orladeyo pill to the Takhzyro (lanadelumab) injection that you would get every 2 weeks (if free of attacks for more than 6 months we could try to space it out to every 4 weeks). As we discussed today we will also try to have Berinert approved for you to have with you and use in the ED as needed in case of any flare-ups. Please feel free to contact us through our office at [phone] and press 0 to talk with our  for any scheduling needs or [phone] to talk with our nursing team if you have any earlier or additional clinical needs. It was a pleasure caring for you today!    ==============================    ANGIOEDEMA    What is angioedema? - Angioedema is a condition that causes puffiness in the tissue under the skin. People with angioedema might have swelling of the face, eyelids, ears, mouth, tongue, hands, feet, or genitals.    Some people who get angioedema also get hives. Hives are red, raised patches of skin that are very itchy. Sometimes, people have symptoms of angioedema when they are having a dangerous allergic reaction. Call for an ambulance (in the [place] and [place], dial [id number]) if you suddenly have puffiness or hives plus any of the following:  · Trouble breathing  · Tightness in your throat  · Trouble swallowing your saliva  · Nausea and vomiting  · Cramps or stomach pain  · Passing out    Why did I get angioedema? - A common cause of angioedema is allergies. If you just got angioedema for the first time, it might be because you have a new allergy to something. Allergies to the following things can cause angioedema:  · Medicines (described below)  · Insect stings  · Foods, such as eggs, nuts, fish, or shellfish  · Something you have touched, such as a plant, animal  "saliva, or latex  · Exercise  · The medicines that can cause angioedema include:  · Antibiotics (usually with hives, trouble breathing, and other symptoms of an allergic reaction)  · Medicines used to treat high blood pressure or heart disease called angiotensin-converting enzyme inhibitors (or \"ACE inhibitors\") - Examples include enalapril, captopril, and lisinopril. People who get angioedema because of these medicines usually don't have hives or itching.  · Over-the-counter medicines for pain and fever, such as aspirin, ibuprofen (sample brand names: Motrin, Advil), and naproxen (sample brand name: Aleve).    Angioedema can also be caused by rare diseases that sometimes run in families. An example is hereditary angioedema. In this disease, people get repeated attacks of angioedema, belly pain, or swelling in the throat. These attacks last 2 to 5 days and then get better. However, the disease is serious because swelling in the throat can cut off the air supply. If you have angioedema and other people in your family do too, you should see a doctor. There is testing and treatment for hereditary angioedema.    Sometimes, doctors don't know the cause of angioedema.    Is there a test for angioedema? - There is no test for most types of angioedema. But your doctor or nurse should be able to tell if you have angioedema by learning about your symptoms and doing an exam.    How is angioedema treated? - The treatment depends on how serious your symptoms are. If you get angioedema because of a dangerous allergic reaction, you will need to be treated in a hospital right away. At the hospital, the staff will give you treatments to stop the allergic reaction and help your symptoms.  If your symptoms are mild, you might not need treatment. But you should try to figure out if anything triggered your symptoms. If so, you will need to avoid that trigger.  Your doctor might recommend that you take medicines called antihistamines. " These are the same medicines people take for seasonal allergies.  Your doctor might also prescribe medicines called steroids. Steroids can help with itching and reduce swelling. But you should not take steroids for any longer than you need them, because they can cause serious side effects. These are not the same as the steroids some athletes take illegally.  If you got angioedema because of a medicine you took, your doctor will switch you to a different medicine.    Can angioedema be prevented? - You can lower your chances of getting angioedema by avoiding foods, medicines, or insects that make you have an allergic reaction. If you get angioedema a lot, your doctor might recommend that you take antihistamines every day.    ==============================

## 2025-02-25 DIAGNOSIS — D84.1 HEREDITARY ANGIOEDEMA (MULTI): ICD-10-CM

## 2025-02-25 LAB
A ALTERNATA IGE QN: <0.1 KU/L
A FUMIGATUS IGE QN: <0.1 KU/L
BERMUDA GRASS IGE QN: <0.1 KU/L
BOXELDER IGE QN: <0.1 KU/L
C HERBARUM IGE QN: <0.1 KU/L
CALIF WALNUT POLN IGE QN: <0.1 KU/L
CAT DANDER IGE QN: <0.1 KU/L
CMN PIGWEED IGE QN: NORMAL
COMMON RAGWEED IGE QN: NORMAL
COTTONWOOD IGE QN: <0.1 KU/L
D FARINAE IGE QN: <0.1 KU/L
D PTERONYSS IGE QN: <0.1 KU/L
DOG DANDER IGE QN: <0.1 KU/L
ENGL PLANTAIN IGE QN: NORMAL
GOOSEFOOT IGE QN: NORMAL
JOHNSON GRASS IGE QN: <0.1 KU/L
KENT BLUE GRASS IGE QN: <0.1 KU/L
LONDON PLANE IGE QN: <0.1 KU/L
MT JUNIPER IGE QN: NORMAL
P NOTATUM IGE QN: <0.1 KU/L
PECAN/HICK TREE IGE QN: <0.1 KU/L
ROACH IGE QN: <0.1 KU/L
SALTWORT IGE QN: NORMAL
SHEEP SORREL IGE QN: NORMAL
SILVER BIRCH IGE QN: <0.1 KU/L
TIMOTHY IGE QN: <0.1 KU/L
TOTAL IGE SMQN RAST: 24.5 KU/L
WHITE ASH IGE QN: <0.1 KU/L
WHITE ELM IGE QN: NORMAL
WHITE MULBERRY IGE QN: NORMAL
WHITE OAK IGE QN: NORMAL

## 2025-02-25 RX ORDER — C1 ESTERASE INHIBITOR 500(10 ML)
1000 VIAL (EA) INTRAVENOUS ONCE AS NEEDED
Qty: 2 EACH | Refills: 3 | Status: SHIPPED | OUTPATIENT
Start: 2025-02-25

## 2025-02-25 RX ORDER — C1 ESTERASE INHIBITOR 500(10 ML)
1000 VIAL (EA) INTRAVENOUS ONCE AS NEEDED
Qty: 2 EACH | Refills: 3 | Status: SHIPPED | OUTPATIENT
Start: 2025-02-25 | End: 2025-02-25 | Stop reason: SDUPTHER

## 2025-02-25 NOTE — PROGRESS NOTES
PAs approved for both Takhzyro and Berinert but LDD. Rerouted prescriptions to [facility] for dispensing under protocol with Aniceto Calvin MD.

## 2025-02-26 ENCOUNTER — TELEPHONE (OUTPATIENT)
Dept: ALLERGY | Facility: HOSPITAL | Age: 20
End: 2025-02-26
Payer: COMMERCIAL

## 2025-02-26 NOTE — TELEPHONE ENCOUNTER
Division  received a call regarding respiratory allergy profile. Parts of profile cancelled due to quantity insufficient.

## 2025-02-28 ENCOUNTER — PHARMACY VISIT (OUTPATIENT)
Dept: PHARMACY | Facility: CLINIC | Age: 20
End: 2025-02-28
Payer: MEDICAID

## 2025-03-05 ENCOUNTER — HOSPITAL ENCOUNTER (EMERGENCY)
Facility: HOSPITAL | Age: 20
Discharge: HOME | End: 2025-03-05
Attending: EMERGENCY MEDICINE
Payer: COMMERCIAL

## 2025-03-05 VITALS
DIASTOLIC BLOOD PRESSURE: 76 MMHG | TEMPERATURE: 98.1 F | SYSTOLIC BLOOD PRESSURE: 124 MMHG | WEIGHT: 122 LBS | RESPIRATION RATE: 16 BRPM | HEART RATE: 98 BPM | OXYGEN SATURATION: 99 % | BODY MASS INDEX: 23.03 KG/M2 | HEIGHT: 61 IN

## 2025-03-05 DIAGNOSIS — Z71.1 CONCERN ABOUT STD IN FEMALE WITHOUT DIAGNOSIS: ICD-10-CM

## 2025-03-05 DIAGNOSIS — N89.8 VAGINAL DISCHARGE: Primary | ICD-10-CM

## 2025-03-05 LAB
APPEARANCE UR: CLEAR
BILIRUB UR STRIP.AUTO-MCNC: NEGATIVE MG/DL
CLUE CELLS SPEC QL WET PREP: NORMAL
COLOR UR: YELLOW
GLUCOSE UR STRIP.AUTO-MCNC: NEGATIVE MG/DL
HCG UR QL IA.RAPID: NEGATIVE
KETONES UR STRIP.AUTO-MCNC: NEGATIVE MG/DL
LEUKOCYTE ESTERASE UR QL STRIP.AUTO: ABNORMAL
NITRITE UR QL STRIP.AUTO: NEGATIVE
PH UR STRIP.AUTO: 7 [PH]
PROT UR STRIP.AUTO-MCNC: NEGATIVE MG/DL
RBC # UR STRIP.AUTO: NEGATIVE MG/DL
RBC #/AREA URNS AUTO: NORMAL /HPF
SP GR UR STRIP.AUTO: 1.02
SQUAMOUS #/AREA URNS AUTO: NORMAL /HPF
T VAGINALIS SPEC QL WET PREP: NORMAL
TRICHOMONAS REFLEX COMMENT: NORMAL
UROBILINOGEN UR STRIP.AUTO-MCNC: <2 MG/DL
WBC #/AREA URNS AUTO: NORMAL /HPF
WBC VAG QL WET PREP: NORMAL
YEAST VAG QL WET PREP: NORMAL

## 2025-03-05 PROCEDURE — 2500000004 HC RX 250 GENERAL PHARMACY W/ HCPCS (ALT 636 FOR OP/ED): Mod: SE | Performed by: EMERGENCY MEDICINE

## 2025-03-05 PROCEDURE — 2500000004 HC RX 250 GENERAL PHARMACY W/ HCPCS (ALT 636 FOR OP/ED): Mod: SE

## 2025-03-05 PROCEDURE — 96372 THER/PROPH/DIAG INJ SC/IM: CPT | Performed by: EMERGENCY MEDICINE

## 2025-03-05 PROCEDURE — 87491 CHLMYD TRACH DNA AMP PROBE: CPT | Mod: CONLAB | Performed by: EMERGENCY MEDICINE

## 2025-03-05 PROCEDURE — 87205 SMEAR GRAM STAIN: CPT | Mod: CONLAB | Performed by: EMERGENCY MEDICINE

## 2025-03-05 PROCEDURE — 81001 URINALYSIS AUTO W/SCOPE: CPT | Performed by: EMERGENCY MEDICINE

## 2025-03-05 PROCEDURE — 99284 EMERGENCY DEPT VISIT MOD MDM: CPT

## 2025-03-05 PROCEDURE — 2500000001 HC RX 250 WO HCPCS SELF ADMINISTERED DRUGS (ALT 637 FOR MEDICARE OP): Mod: SE | Performed by: EMERGENCY MEDICINE

## 2025-03-05 PROCEDURE — 87210 SMEAR WET MOUNT SALINE/INK: CPT | Performed by: EMERGENCY MEDICINE

## 2025-03-05 PROCEDURE — 81025 URINE PREGNANCY TEST: CPT | Performed by: EMERGENCY MEDICINE

## 2025-03-05 PROCEDURE — 87086 URINE CULTURE/COLONY COUNT: CPT | Mod: CONLAB | Performed by: EMERGENCY MEDICINE

## 2025-03-05 PROCEDURE — 99283 EMERGENCY DEPT VISIT LOW MDM: CPT | Performed by: EMERGENCY MEDICINE

## 2025-03-05 PROCEDURE — 87661 TRICHOMONAS VAGINALIS AMPLIF: CPT | Mod: CONLAB | Performed by: EMERGENCY MEDICINE

## 2025-03-05 RX ORDER — LIDOCAINE HYDROCHLORIDE 10 MG/ML
INJECTION, SOLUTION INFILTRATION; PERINEURAL
Status: COMPLETED
Start: 2025-03-05 | End: 2025-03-05

## 2025-03-05 RX ORDER — CEFTRIAXONE 500 MG/1
500 INJECTION, POWDER, FOR SOLUTION INTRAMUSCULAR; INTRAVENOUS ONCE
Status: COMPLETED | OUTPATIENT
Start: 2025-03-05 | End: 2025-03-05

## 2025-03-05 RX ORDER — DOXYCYCLINE HYCLATE 100 MG
100 TABLET ORAL ONCE
Status: COMPLETED | OUTPATIENT
Start: 2025-03-05 | End: 2025-03-05

## 2025-03-05 RX ORDER — DOXYCYCLINE 100 MG/1
100 TABLET ORAL 2 TIMES DAILY
Qty: 14 TABLET | Refills: 0 | Status: SHIPPED | OUTPATIENT
Start: 2025-03-05 | End: 2025-03-12

## 2025-03-05 RX ADMIN — DOXYCYCLINE HYCLATE 100 MG: 100 TABLET, COATED ORAL at 16:55

## 2025-03-05 RX ADMIN — LIDOCAINE HYDROCHLORIDE 20 ML: 10 INJECTION, SOLUTION INFILTRATION; PERINEURAL at 16:55

## 2025-03-05 RX ADMIN — CEFTRIAXONE SODIUM 500 MG: 500 INJECTION, POWDER, FOR SOLUTION INTRAMUSCULAR; INTRAVENOUS at 16:54

## 2025-03-05 ASSESSMENT — PAIN SCALES - GENERAL
PAINLEVEL_OUTOF10: 0 - NO PAIN
PAINLEVEL_OUTOF10: 0 - NO PAIN

## 2025-03-05 ASSESSMENT — PAIN DESCRIPTION - PROGRESSION: CLINICAL_PROGRESSION: NOT CHANGED

## 2025-03-05 ASSESSMENT — PAIN - FUNCTIONAL ASSESSMENT
PAIN_FUNCTIONAL_ASSESSMENT: 0-10
PAIN_FUNCTIONAL_ASSESSMENT: 0-10

## 2025-03-05 NOTE — ED PROVIDER NOTES
Emergency Department Provider Note        History of Present Illness     History provided by: Patient  Limitations to History: None  External Records Reviewed with Brief Summary: Outpatient progress note from allergy  which showed hereditary angioedema type I    HPI:  Bree Wood is a 19 y.o. female with history of hereditary angioedema type I presents for evaluation of vaginal discharge.  States that she in a casual noncommitted relationship with a male partner.  She is having unprotected sex.  She states that she has been having yellow-green and malodorous vaginal discharge.  She is hoping that she has BV but does have some current concerns that she may have an STD.  She denies abdominal pain or fevers.  She denies vaginal lesions.    Physical Exam   Triage vitals:  T 36.7 °C (98.1 °F)  HR (!) 101  /80  RR 18  O2 99 %      General: Awake, alert, in no acute distress  Eyes: Gaze conjugate.  No scleral icterus or injection  HENT: Normo-cephalic, atraumatic. No stridor  CV: Regular rate, regular rhythm. Radial pulses 2+ bilaterally  Resp: Breathing non-labored, speaking in full sentences.  Clear to auscultation bilaterally  GI: Soft, non-distended, non-tender. No rebound or guarding.  : Deferred  MSK/Extremities: No gross bony deformities. Moving all extremities  Skin: Warm. Appropriate color  Neuro: Alert. Oriented. Face symmetric. Speech is fluent.  Gross strength and sensation intact in b/l UE and LEs  Psych: Appropriate mood and affect    Medical Decision Making & ED Course   Medical Decision Makin y.o. female presents with vaginal discharge.  Will check vaginal swabs for GC chlamydia yeast BV and trichomonas.  Urinalysis and hCG.  Will treat empirically with Rocephin and place on doxycycline.  Educated regarding safe sex practices.  PCP follow-up.  Return for repeat evaluation at anytime with any concerns.  Patient agreeable with plan verbalized understanding.  Discharged  home.  ----      Differential diagnoses considered include but are not limited to: STD, bacterial vaginosis, yeast     The patient was discussed with the following consultants/services:  hospital laboratory    Care Considerations: Considered the following dispositions for the patient: admission - no fever, no vomiting - tolerating oral intake.  Low concern for PID    ED Course:  ED Course as of 03/05/25 1701   Wed Mar 05, 2025   1700 Wet prep negative for trichomonas clue cells or yeast.  This excludes BV or yeast.  Trichomonas doubtful.  This raises my index of suspicion for GC or chlamydia. [BT]   1700 Patient was educated regarding safe sex practices, need for follow-up with gynecology.  Advised to return with any concerns.  She is agreeable with this plan and verbalized understanding. [BT]      ED Course User Index  [BT] Antonino Hartley DO         Diagnoses as of 03/05/25 1701   Vaginal discharge   Concern about STD in female without diagnosis     Disposition   As a result of the work-up, the patient was discharged home.  she was informed of her diagnosis and instructed to come back with any concerns or worsening of condition.  she and was agreeable to the plan as discussed above.  she was given the opportunity to ask questions.  All of the patient's questions were answered.    Procedures   Procedures        Antonino Hartley DO  Emergency Medicine     Antonino Hartley,   03/05/25 1625       Antonino Hartley DO  03/05/25 1701

## 2025-03-07 LAB
BACTERIA UR CULT: NO GROWTH
CLUE CELLS VAG LPF-#/AREA: PRESENT /[LPF]
NUGENT SCORE: 8
T VAGINALIS RRNA SPEC QL NAA+PROBE: NEGATIVE
YEAST VAG WET PREP-#/AREA: ABNORMAL

## 2025-03-10 ENCOUNTER — TELEPHONE (OUTPATIENT)
Dept: PHARMACY | Facility: HOSPITAL | Age: 20
End: 2025-03-10
Payer: COMMERCIAL

## 2025-03-10 DIAGNOSIS — N76.0 BACTERIAL VAGINOSIS: Primary | ICD-10-CM

## 2025-03-10 DIAGNOSIS — B96.89 BACTERIAL VAGINOSIS: Primary | ICD-10-CM

## 2025-03-10 RX ORDER — METRONIDAZOLE 500 MG/1
500 TABLET ORAL 2 TIMES DAILY
Qty: 14 TABLET | Refills: 0 | Status: SHIPPED | OUTPATIENT
Start: 2025-03-10 | End: 2025-03-17

## 2025-03-10 NOTE — PROGRESS NOTES
EDPD Note: Negative Results    The EDPD Post-Discharge Team was called regarding Bree Wood  STD culture/result that was taken during their recent emergency room visit. I gave patient their results. The culture/result were negative and there were no other questions at this time.  C. trachomatis / N. gonorrhoeae, Amplified, Urogenital: [id number]  Order: [id number]   Collected 3/5/2025 16:27    Status: Final result    Visible to patient: Yes (seen)    0 Result Notes    1  Topic      Component  Ref Range & Units    Neisseria gonorrhea,Amplified  Negative Negative   Chlamydia trachomatis, Amplified  Negative Negative   Resulting Agency [facility]        Patient counseled to discontinue doxycycline at this time.      If there are any other questions for the ED Post-Discharge Culture Follow Up Team, please contact [phone]. Fax: [phone].    Jenna España, PharmD   Meds PGY-1 Resident   [phone]

## 2025-03-10 NOTE — PROGRESS NOTES
EDPD Note: Initiation     Contacted Bree Wood regarding a positive BV culture/result that was taken during their recent emergency room visit. I completed education with patient. The patient is not being treated appropriately.    The following prescription was sent to the patient's preferred pharmacy. No further follow up needed from EDPD Team.     Drug: Metronidazole 500 mg  Si tab PO BID x 7 days  Qty: 14 tablets  Days Supply: 7 days     Vaginitis Gram Stain For Bacterial Vaginosis + Yeast  Order: [id number]   Collected 3/5/2025 16:27       Status: Final result       Visible to patient: Yes (seen)    Specimen Information: Vaginal; Swab   1 Result Note      Component  Ref Range & Units    El Score  0 - 3 8 High    Comment: Interpretation of the El Score  0-3.....Normal vaginal microbiota  4-6.....Intermediate results  7-10....Bacterial vaginosis   Yeast  ABSENT ABSENT   Clue Cells  ABSENT PRESENT Abnormal    Resulting Agency [facility]        Patient presented to ED for vaginal discharge. Treated empirically with rocephin and doxycycline. Wet prep negative, however above results positive for clue cells. Vaginal discharge still present at time of phone call.     If there are any other questions for the ED Post-Discharge Culture Follow Up Team, please contact [phone]. Fax: [phone].    Jenna España, VidalD   Meds PGY-1 Resident   [phone]

## 2025-03-11 ENCOUNTER — APPOINTMENT (OUTPATIENT)
Dept: CT IMAGING | Age: 20
DRG: 504 | End: 2025-03-11
Payer: OTHER MISCELLANEOUS

## 2025-03-11 ENCOUNTER — APPOINTMENT (OUTPATIENT)
Dept: GENERAL RADIOLOGY | Age: 20
DRG: 504 | End: 2025-03-11
Payer: OTHER MISCELLANEOUS

## 2025-03-11 ENCOUNTER — HOSPITAL ENCOUNTER (INPATIENT)
Age: 20
LOS: 3 days | Discharge: HOME | DRG: 504 | End: 2025-03-14
Attending: EMERGENCY MEDICINE | Admitting: SURGERY
Payer: OTHER MISCELLANEOUS

## 2025-03-11 DIAGNOSIS — S93.324A LISFRANC'S DISLOCATION, RIGHT, INITIAL ENCOUNTER: ICD-10-CM

## 2025-03-11 DIAGNOSIS — S01.21XA LACERATION OF NOSE, INITIAL ENCOUNTER: ICD-10-CM
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
## 2025-03-11 DIAGNOSIS — S93.324A LISFRANC DISLOCATION, RIGHT, INITIAL ENCOUNTER: ICD-10-CM

## 2025-03-11 DIAGNOSIS — S09.90XA INJURY OF HEAD, INITIAL ENCOUNTER: ICD-10-CM

## 2025-03-11 PROBLEM — S92.901A CLOSED FRACTURE OF BONE OF RIGHT FOOT: Status: ACTIVE | Noted: 2025-03-11

## 2025-03-11 PROBLEM — S02.2XXB NASAL BONES, OPEN FRACTURE: Status: ACTIVE | Noted: 2025-03-11

## 2025-03-11 PROBLEM — R74.8 ELEVATED CK: Status: ACTIVE | Noted: 2025-03-11

## 2025-03-11 LAB
ABO + RH BLD: NORMAL
ABO + RH BLD: NORMAL
ALBUMIN SERPL-MCNC: 4.1 G/DL (ref 3.5–5.2)
ALBUMIN SERPL-MCNC: 4.1 G/DL (ref 3.5–5.2)
ALP SERPL-CCNC: 63 U/L (ref 35–104)
ALP SERPL-CCNC: 63 U/L (ref 35–104)
ALT SERPL-CCNC: 29 U/L (ref 0–32)
ALT SERPL-CCNC: 29 U/L (ref 0–32)
ANION GAP SERPL CALCULATED.3IONS-SCNC: 12 MMOL/L (ref 7–16)
ANION GAP SERPL CALCULATED.3IONS-SCNC: 12 MMOL/L (ref 7–16)
ARM BAND NUMBER: NORMAL
ARM BAND NUMBER: NORMAL
AST SERPL-CCNC: 19 U/L (ref 0–31)
AST SERPL-CCNC: 19 U/L (ref 0–31)
BILIRUB SERPL-MCNC: 0.4 MG/DL (ref 0–1.2)
BILIRUB SERPL-MCNC: 0.4 MG/DL (ref 0–1.2)
BLOOD BANK SAMPLE EXPIRATION: NORMAL
BLOOD BANK SAMPLE EXPIRATION: NORMAL
BLOOD GROUP ANTIBODIES SERPL: NEGATIVE
BLOOD GROUP ANTIBODIES SERPL: NEGATIVE
BUN SERPL-MCNC: 13 MG/DL (ref 6–20)
BUN SERPL-MCNC: 13 MG/DL (ref 6–20)
CALCIUM SERPL-MCNC: 8.3 MG/DL (ref 8.6–10.2)
CALCIUM SERPL-MCNC: 8.3 MG/DL (ref 8.6–10.2)
CHLORIDE SERPL-SCNC: 107 MMOL/L (ref 98–107)
CHLORIDE SERPL-SCNC: 107 MMOL/L (ref 98–107)
CK SERPL-CCNC: 378 U/L (ref 20–180)
CK SERPL-CCNC: 378 U/L (ref 20–180)
CO2 SERPL-SCNC: 22 MMOL/L (ref 22–29)
CO2 SERPL-SCNC: 22 MMOL/L (ref 22–29)
CREAT SERPL-MCNC: 0.8 MG/DL (ref 0.5–1)
CREAT SERPL-MCNC: 0.8 MG/DL (ref 0.5–1)
ERYTHROCYTE [DISTWIDTH] IN BLOOD BY AUTOMATED COUNT: 11.9 % (ref 11.5–15)
ERYTHROCYTE [DISTWIDTH] IN BLOOD BY AUTOMATED COUNT: 11.9 % (ref 11.5–15)
GFR, ESTIMATED: >90 ML/MIN/1.73M2
GFR, ESTIMATED: >90 ML/MIN/1.73M2
GLUCOSE SERPL-MCNC: 134 MG/DL (ref 74–99)
GLUCOSE SERPL-MCNC: 134 MG/DL (ref 74–99)
HCT VFR BLD AUTO: 34.7 % (ref 34–48)
HCT VFR BLD AUTO: 34.7 % (ref 34–48)
HGB BLD-MCNC: 11.7 G/DL (ref 11.5–15.5)
HGB BLD-MCNC: 11.7 G/DL (ref 11.5–15.5)
INR PPP: 1.1
INR PPP: 1.1
LACTATE BLDV-SCNC: 1.8 MMOL/L (ref 0.5–2.2)
LACTATE BLDV-SCNC: 1.8 MMOL/L (ref 0.5–2.2)
MCH RBC QN AUTO: 31.1 PG (ref 26–35)
MCH RBC QN AUTO: 31.1 PG (ref 26–35)
MCHC RBC AUTO-ENTMCNC: 33.7 G/DL (ref 32–34.5)
MCHC RBC AUTO-ENTMCNC: 33.7 G/DL (ref 32–34.5)
MCV RBC AUTO: 92.3 FL (ref 80–99.9)
MCV RBC AUTO: 92.3 FL (ref 80–99.9)
PARTIAL THROMBOPLASTIN TIME: 24.7 SEC (ref 24.5–35.1)
PARTIAL THROMBOPLASTIN TIME: 24.7 SEC (ref 24.5–35.1)
PLATELET # BLD AUTO: 212 K/UL (ref 130–450)
PLATELET # BLD AUTO: 212 K/UL (ref 130–450)
PMV BLD AUTO: 9.3 FL (ref 7–12)
PMV BLD AUTO: 9.3 FL (ref 7–12)
POTASSIUM SERPL-SCNC: 3.3 MMOL/L (ref 3.5–5)
POTASSIUM SERPL-SCNC: 3.3 MMOL/L (ref 3.5–5)
PROT SERPL-MCNC: 6.4 G/DL (ref 6.4–8.3)
PROT SERPL-MCNC: 6.4 G/DL (ref 6.4–8.3)
PROTHROMBIN TIME: 11.6 SEC (ref 9.3–12.4)
PROTHROMBIN TIME: 11.6 SEC (ref 9.3–12.4)
RBC # BLD AUTO: 3.76 M/UL (ref 3.5–5.5)
RBC # BLD AUTO: 3.76 M/UL (ref 3.5–5.5)
SODIUM SERPL-SCNC: 141 MMOL/L (ref 132–146)
SODIUM SERPL-SCNC: 141 MMOL/L (ref 132–146)
WBC OTHER # BLD: 9.2 K/UL (ref 4.5–11.5)
WBC OTHER # BLD: 9.2 K/UL (ref 4.5–11.5)

## 2025-03-11 PROCEDURE — 09QK0ZZ REPAIR NASAL MUCOSA AND SOFT TISSUE, OPEN APPROACH: ICD-10-PCS | Performed by: SURGERY

## 2025-03-11 PROCEDURE — 1200000000 HC SEMI PRIVATE

## 2025-03-11 PROCEDURE — 83605 ASSAY OF LACTIC ACID: CPT

## 2025-03-11 PROCEDURE — 6360000004 HC RX CONTRAST MEDICATION: Performed by: NURSE PRACTITIONER

## 2025-03-11 PROCEDURE — 2500000003 HC RX 250 WO HCPCS

## 2025-03-11 PROCEDURE — 73630 X-RAY EXAM OF FOOT: CPT

## 2025-03-11 PROCEDURE — 80053 COMPREHEN METABOLIC PANEL: CPT

## 2025-03-11 PROCEDURE — 99285 EMERGENCY DEPT VISIT HI MDM: CPT

## 2025-03-11 PROCEDURE — 6810039000 HC L1 TRAUMA ALERT

## 2025-03-11 PROCEDURE — 73700 CT LOWER EXTREMITY W/O DYE: CPT

## 2025-03-11 PROCEDURE — 6360000002 HC RX W HCPCS: Performed by: SURGERY

## 2025-03-11 PROCEDURE — 85027 COMPLETE CBC AUTOMATED: CPT

## 2025-03-11 PROCEDURE — 84165 PROTEIN E-PHORESIS SERUM: CPT

## 2025-03-11 PROCEDURE — 96375 TX/PRO/DX INJ NEW DRUG ADDON: CPT

## 2025-03-11 PROCEDURE — 6370000000 HC RX 637 (ALT 250 FOR IP)

## 2025-03-11 PROCEDURE — 82550 ASSAY OF CK (CPK): CPT

## 2025-03-11 PROCEDURE — 73620 X-RAY EXAM OF FOOT: CPT

## 2025-03-11 PROCEDURE — 0SSK04Z REPOSITION RIGHT TARSOMETATARSAL JOINT WITH INTERNAL FIXATION DEVICE, OPEN APPROACH: ICD-10-PCS | Performed by: SURGERY

## 2025-03-11 PROCEDURE — 85610 PROTHROMBIN TIME: CPT

## 2025-03-11 PROCEDURE — 99157 MOD SED OTHER PHYS/QHP EA: CPT

## 2025-03-11 PROCEDURE — 70486 CT MAXILLOFACIAL W/O DYE: CPT

## 2025-03-11 PROCEDURE — 72170 X-RAY EXAM OF PELVIS: CPT

## 2025-03-11 PROCEDURE — 80307 DRUG TEST PRSMV CHEM ANLYZR: CPT

## 2025-03-11 PROCEDURE — 73030 X-RAY EXAM OF SHOULDER: CPT

## 2025-03-11 PROCEDURE — 80179 DRUG ASSAY SALICYLATE: CPT

## 2025-03-11 PROCEDURE — G0480 DRUG TEST DEF 1-7 CLASSES: HCPCS

## 2025-03-11 PROCEDURE — 0SSKXZZ REPOSITION RIGHT TARSOMETATARSAL JOINT, EXTERNAL APPROACH: ICD-10-PCS | Performed by: SURGERY

## 2025-03-11 PROCEDURE — 2580000003 HC RX 258

## 2025-03-11 PROCEDURE — 6360000002 HC RX W HCPCS

## 2025-03-11 PROCEDURE — 99156 MOD SED OTH PHYS/QHP 5/>YRS: CPT

## 2025-03-11 PROCEDURE — 2580000003 HC RX 258: Performed by: STUDENT IN AN ORGANIZED HEALTH CARE EDUCATION/TRAINING PROGRAM

## 2025-03-11 PROCEDURE — 72125 CT NECK SPINE W/O DYE: CPT

## 2025-03-11 PROCEDURE — 96365 THER/PROPH/DIAG IV INF INIT: CPT

## 2025-03-11 PROCEDURE — 80143 DRUG ASSAY ACETAMINOPHEN: CPT

## 2025-03-11 PROCEDURE — 86900 BLOOD TYPING SEROLOGIC ABO: CPT

## 2025-03-11 PROCEDURE — 2500000003 HC RX 250 WO HCPCS: Performed by: NURSE PRACTITIONER

## 2025-03-11 PROCEDURE — 73110 X-RAY EXAM OF WRIST: CPT

## 2025-03-11 PROCEDURE — 71260 CT THORAX DX C+: CPT

## 2025-03-11 PROCEDURE — 74177 CT ABD & PELVIS W/CONTRAST: CPT

## 2025-03-11 PROCEDURE — 70450 CT HEAD/BRAIN W/O DYE: CPT

## 2025-03-11 PROCEDURE — 71045 X-RAY EXAM CHEST 1 VIEW: CPT

## 2025-03-11 PROCEDURE — 6360000002 HC RX W HCPCS: Performed by: STUDENT IN AN ORGANIZED HEALTH CARE EDUCATION/TRAINING PROGRAM

## 2025-03-11 PROCEDURE — 84155 ASSAY OF PROTEIN SERUM: CPT

## 2025-03-11 PROCEDURE — 85730 THROMBOPLASTIN TIME PARTIAL: CPT

## 2025-03-11 PROCEDURE — 86850 RBC ANTIBODY SCREEN: CPT

## 2025-03-11 PROCEDURE — 0SSK34Z REPOSITION RIGHT TARSOMETATARSAL JOINT WITH INTERNAL FIXATION DEVICE, PERCUTANEOUS APPROACH: ICD-10-PCS | Performed by: SURGERY

## 2025-03-11 PROCEDURE — 99223 1ST HOSP IP/OBS HIGH 75: CPT | Performed by: SURGERY

## 2025-03-11 PROCEDURE — 86901 BLOOD TYPING SEROLOGIC RH(D): CPT

## 2025-03-11 RX ORDER — ONDANSETRON 2 MG/ML
4 INJECTION INTRAMUSCULAR; INTRAVENOUS EVERY 6 HOURS PRN
Status: DISCONTINUED | OUTPATIENT
Start: 2025-03-11 | End: 2025-03-14 | Stop reason: HOSPADM

## 2025-03-11 RX ORDER — FENTANYL CITRATE 50 UG/ML
INJECTION, SOLUTION INTRAMUSCULAR; INTRAVENOUS DAILY PRN
Status: COMPLETED | OUTPATIENT
Start: 2025-03-11 | End: 2025-03-11

## 2025-03-11 RX ORDER — LIDOCAINE HYDROCHLORIDE 10 MG/ML
INJECTION, SOLUTION INFILTRATION; PERINEURAL
Status: COMPLETED
Start: 2025-03-11 | End: 2025-03-11

## 2025-03-11 RX ORDER — SODIUM CHLORIDE, SODIUM LACTATE, POTASSIUM CHLORIDE, CALCIUM CHLORIDE 600; 310; 30; 20 MG/100ML; MG/100ML; MG/100ML; MG/100ML
INJECTION, SOLUTION INTRAVENOUS CONTINUOUS
Status: DISCONTINUED | OUTPATIENT
Start: 2025-03-11 | End: 2025-03-12

## 2025-03-11 RX ORDER — SODIUM CHLORIDE 0.9 % (FLUSH) 0.9 %
5-40 SYRINGE (ML) INJECTION PRN
Status: DISCONTINUED | OUTPATIENT
Start: 2025-03-11 | End: 2025-03-14 | Stop reason: HOSPADM

## 2025-03-11 RX ORDER — PROPOFOL 10 MG/ML
INJECTION, EMULSION INTRAVENOUS
Status: COMPLETED
Start: 2025-03-11 | End: 2025-03-11

## 2025-03-11 RX ORDER — ACETAMINOPHEN 650 MG
TABLET, EXTENDED RELEASE ORAL ONCE
Status: COMPLETED | OUTPATIENT
Start: 2025-03-11 | End: 2025-03-11

## 2025-03-11 RX ORDER — ACETAMINOPHEN 325 MG/1
650 TABLET ORAL EVERY 6 HOURS SCHEDULED
Status: DISCONTINUED | OUTPATIENT
Start: 2025-03-11 | End: 2025-03-14 | Stop reason: HOSPADM

## 2025-03-11 RX ORDER — OXYCODONE HYDROCHLORIDE 5 MG/1
5 TABLET ORAL EVERY 4 HOURS PRN
Refills: 0 | Status: DISCONTINUED | OUTPATIENT
Start: 2025-03-11 | End: 2025-03-14 | Stop reason: HOSPADM

## 2025-03-11 RX ORDER — LANADELUMAB-FLYO 150 MG/ML
3 SOLUTION SUBCUTANEOUS
COMMUNITY

## 2025-03-11 RX ORDER — SODIUM CHLORIDE 0.9 % (FLUSH) 0.9 %
5-40 SYRINGE (ML) INJECTION EVERY 12 HOURS SCHEDULED
Status: DISCONTINUED | OUTPATIENT
Start: 2025-03-11 | End: 2025-03-14 | Stop reason: HOSPADM

## 2025-03-11 RX ORDER — BACITRACIN ZINC 500 [USP'U]/G
OINTMENT TOPICAL 3 TIMES DAILY
Status: DISCONTINUED | OUTPATIENT
Start: 2025-03-11 | End: 2025-03-14 | Stop reason: HOSPADM

## 2025-03-11 RX ORDER — SODIUM CHLORIDE 9 MG/ML
INJECTION, SOLUTION INTRAVENOUS PRN
Status: DISCONTINUED | OUTPATIENT
Start: 2025-03-11 | End: 2025-03-14 | Stop reason: HOSPADM

## 2025-03-11 RX ORDER — ONDANSETRON 4 MG/1
4 TABLET, ORALLY DISINTEGRATING ORAL EVERY 8 HOURS PRN
Status: DISCONTINUED | OUTPATIENT
Start: 2025-03-11 | End: 2025-03-14 | Stop reason: HOSPADM

## 2025-03-11 RX ORDER — SENNA AND DOCUSATE SODIUM 50; 8.6 MG/1; MG/1
1 TABLET, FILM COATED ORAL 2 TIMES DAILY
Status: DISCONTINUED | OUTPATIENT
Start: 2025-03-11 | End: 2025-03-14 | Stop reason: HOSPADM

## 2025-03-11 RX ORDER — METRONIDAZOLE 500 MG/1
500 TABLET ORAL 2 TIMES DAILY
COMMUNITY
End: 2025-03-21 | Stop reason: ALTCHOICE

## 2025-03-11 RX ORDER — METHOCARBAMOL 750 MG/1
750 TABLET, FILM COATED ORAL 4 TIMES DAILY
Status: DISCONTINUED | OUTPATIENT
Start: 2025-03-11 | End: 2025-03-14 | Stop reason: HOSPADM

## 2025-03-11 RX ORDER — SODIUM CHLORIDE 0.9 % (FLUSH) 0.9 %
10 SYRINGE (ML) INJECTION
Status: COMPLETED | OUTPATIENT
Start: 2025-03-11 | End: 2025-03-11

## 2025-03-11 RX ORDER — LIDOCAINE HYDROCHLORIDE AND EPINEPHRINE 10; 10 MG/ML; UG/ML
20 INJECTION, SOLUTION INFILTRATION; PERINEURAL ONCE
Status: COMPLETED | OUTPATIENT
Start: 2025-03-11 | End: 2025-03-11

## 2025-03-11 RX ORDER — OXYCODONE HYDROCHLORIDE 10 MG/1
10 TABLET ORAL EVERY 4 HOURS PRN
Refills: 0 | Status: DISCONTINUED | OUTPATIENT
Start: 2025-03-11 | End: 2025-03-14 | Stop reason: HOSPADM

## 2025-03-11 RX ORDER — KETAMINE HYDROCHLORIDE 10 MG/ML
70 INJECTION, SOLUTION INTRAMUSCULAR; INTRAVENOUS ONCE
Status: DISCONTINUED | OUTPATIENT
Start: 2025-03-11 | End: 2025-03-13

## 2025-03-11 RX ORDER — METRONIDAZOLE 500 MG/1
500 TABLET ORAL 2 TIMES DAILY
Status: DISCONTINUED | OUTPATIENT
Start: 2025-03-12 | End: 2025-03-14 | Stop reason: HOSPADM

## 2025-03-11 RX ORDER — IOPAMIDOL 755 MG/ML
75 INJECTION, SOLUTION INTRAVASCULAR
Status: COMPLETED | OUTPATIENT
Start: 2025-03-11 | End: 2025-03-11

## 2025-03-11 RX ADMIN — OXYCODONE HYDROCHLORIDE 10 MG: 10 TABLET ORAL at 23:40

## 2025-03-11 RX ADMIN — ACETAMINOPHEN 650 MG: 325 TABLET ORAL at 18:13

## 2025-03-11 RX ADMIN — LIDOCAINE HYDROCHLORIDE AND EPINEPHRINE 20 ML: 10; 10 INJECTION, SOLUTION INFILTRATION; PERINEURAL at 17:27

## 2025-03-11 RX ADMIN — SODIUM CHLORIDE, SODIUM LACTATE, POTASSIUM CHLORIDE, AND CALCIUM CHLORIDE: .6; .31; .03; .02 INJECTION, SOLUTION INTRAVENOUS at 23:29

## 2025-03-11 RX ADMIN — LIDOCAINE HYDROCHLORIDE: 10 INJECTION, SOLUTION INFILTRATION; PERINEURAL at 17:27

## 2025-03-11 RX ADMIN — IOPAMIDOL 75 ML: 755 INJECTION, SOLUTION INTRAVENOUS at 16:08

## 2025-03-11 RX ADMIN — METHOCARBAMOL 750 MG: 750 TABLET ORAL at 21:35

## 2025-03-11 RX ADMIN — SODIUM CHLORIDE, PRESERVATIVE FREE 10 ML: 5 INJECTION INTRAVENOUS at 16:10

## 2025-03-11 RX ADMIN — OXYCODONE 5 MG: 5 TABLET ORAL at 18:13

## 2025-03-11 RX ADMIN — SALINE NASAL SPRAY 1 SPRAY: 1.5 SOLUTION NASAL at 23:30

## 2025-03-11 RX ADMIN — PROPOFOL 70 MG: 10 INJECTION, EMULSION INTRAVENOUS at 20:35

## 2025-03-11 RX ADMIN — Medication 50 MG: at 20:35

## 2025-03-11 RX ADMIN — SENNOSIDES AND DOCUSATE SODIUM 1 TABLET: 50; 8.6 TABLET ORAL at 21:37

## 2025-03-11 RX ADMIN — SODIUM CHLORIDE 3000 MG: 9 INJECTION, SOLUTION INTRAVENOUS at 17:22

## 2025-03-11 RX ADMIN — FENTANYL CITRATE 50 MCG: 50 INJECTION, SOLUTION INTRAMUSCULAR; INTRAVENOUS at 15:57

## 2025-03-11 RX ADMIN — Medication: at 17:28

## 2025-03-11 RX ADMIN — SODIUM CHLORIDE, PRESERVATIVE FREE 10 ML: 5 INJECTION INTRAVENOUS at 23:32

## 2025-03-11 ASSESSMENT — PAIN - FUNCTIONAL ASSESSMENT: PAIN_FUNCTIONAL_ASSESSMENT: PREVENTS OR INTERFERES SOME ACTIVE ACTIVITIES AND ADLS

## 2025-03-11 ASSESSMENT — PAIN SCALES - GENERAL
PAINLEVEL_OUTOF10: 3
PAINLEVEL_OUTOF10: 3
PAINLEVEL_OUTOF10: 7

## 2025-03-11 ASSESSMENT — PAIN DESCRIPTION - ORIENTATION
ORIENTATION: RIGHT

## 2025-03-11 ASSESSMENT — ENCOUNTER SYMPTOMS
SHORTNESS OF BREATH: 0
STRIDOR: 0
COLOR CHANGE: 0
FACIAL SWELLING: 1

## 2025-03-11 ASSESSMENT — PAIN DESCRIPTION - FREQUENCY
FREQUENCY: INTERMITTENT
FREQUENCY: INTERMITTENT

## 2025-03-11 ASSESSMENT — PAIN DESCRIPTION - PAIN TYPE
TYPE: ACUTE PAIN
TYPE: ACUTE PAIN

## 2025-03-11 ASSESSMENT — PAIN DESCRIPTION - DESCRIPTORS
DESCRIPTORS: ACHING
DESCRIPTORS: ACHING;DISCOMFORT;SORE
DESCRIPTORS: ACHING;DISCOMFORT;SORE

## 2025-03-11 ASSESSMENT — PAIN DESCRIPTION - LOCATION
LOCATION: LEG;FOOT
LOCATION: LEG
LOCATION: FOOT

## 2025-03-11 NOTE — PROCEDURES
PROCEDURE NOTE  Date: 3/11/2025   Name: Bela Cifuentes  YOB: 2005    Procedures    LACERATION REPAIR PROCEDURE NOTE    Indication: Laceration - 3cm to outer left nares, 1cm laceration to inner right nares    Procedure: The patient was placed in the appropriate position and the lacerations were copiously irrigated with sterile saline. Anesthesia around the lacerations were obtained by infiltration using 1% Lidocaine with epinephrine. The area was then cleansed with betadine and draped in a sterile fashion. The larger 3cm laceration was closed in two layers. The subcutaneous layer was closed with 3-0 Vicryl using interrupted sutures. The skin was closed with 5-0 fast gut using running-locking sutures. The second smaller laceration was closed with 5-0 fast gut using interrupted sutures. The wound area was then dressed with bacitracin.  Minimal debridement was preformed, flaps were aligned. No foreign body was identified.    Total repaired wound length: 4 cm.     Other Items: None    The patient tolerated the procedure well.    Complications: None    Dr. Jose was available for procedure    Anna Bill MD  Resident, PGY-1  3/11/2025  6:22 PM

## 2025-03-11 NOTE — CONSULTS
Lungs/Pleura: The lungs are without acute process.  No focal consolidation or pulmonary edema.  No evidence of pleural effusion or pneumothorax. Upper Abdomen: Limited images of the upper abdomen are unremarkable. Soft Tissues/Bones: No acute bone or soft tissue abnormality.     No acute cardiopulmonary process.     CT ABDOMEN PELVIS W IV CONTRAST Additional Contrast? None  Result Date: 3/11/2025  EXAMINATION: IV contrast PROTOCOL CT OF THE ABDOMEN AND PELVIS 3/11/2025 4:15 pm TECHNIQUE: CT of the abdomen and pelvis was performed with the administration of intravenous contrast. Multiplanar reformatted images are provided for review. Automated exposure control, iterative reconstruction, and/or weight based adjustment of the mA/kV was utilized to reduce the radiation dose to as low as reasonably achievable. COMPARISON: None. HISTORY: ORDERING SYSTEM PROVIDED HISTORY: trauma TECHNOLOGIST PROVIDED HISTORY: Additional Contrast?->None Reason for exam:->trauma What reading provider will be dictating this exam?->CRC FINDINGS: LOWER CHEST:  Visualized portion of the lower chest demonstrates no acute abnormality. KIDNEYS AND URINARY TRACT: No renal calculi are identified. There is no evidence for hydronephrosis.  The ureters are of normal course and caliber. ORGANS: Visualized portions of the enhanced liver, spleen, pancreas, and adrenal glands demonstrate no acute abnormality. No inflammatory changes in the gallbladder fossa. GI/BOWEL: No bowel obstruction.  No evidence of acute appendicitis.  There is a large amount of retained food contents in the stomach.  There is no evidence of free air or free fluid in the abdomen or pelvis. PELVIS: The bladder and pelvic organs are unremarkable. PERITONEUM/RETROPERITONEUM: No lymphadenopathy is noted. BONES/SOFT TISSUES: The osseous structures demonstrate no acute abnormality.     1. No acute intra-abdominal or pelvic process. 2. Large amount of retained food contents in the stomach.  week to further discuss management interventions.  Please contact on call ENT resident with any questions or concerns      Patient seen and examined by resident and attending on call       Electronically signed by Odessa Gasca DO on 3/11/25 at 6:51 PM EDT
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
## 2025-03-12 ENCOUNTER — ANESTHESIA EVENT (OUTPATIENT)
Dept: OPERATING ROOM | Age: 20
End: 2025-03-12
Payer: OTHER MISCELLANEOUS

## 2025-03-12 ENCOUNTER — APPOINTMENT (OUTPATIENT)
Dept: GENERAL RADIOLOGY | Age: 20
DRG: 504 | End: 2025-03-12
Payer: OTHER MISCELLANEOUS

## 2025-03-12 ENCOUNTER — ANESTHESIA (OUTPATIENT)
Dept: OPERATING ROOM | Age: 20
End: 2025-03-12
Payer: OTHER MISCELLANEOUS
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
## 2025-03-12 PROBLEM — S01.21XA LACERATION OF NOSE: Status: ACTIVE | Noted: 2025-03-12

## 2025-03-12 LAB
ALBUMIN SERPL-MCNC: 3.1 G/DL (ref 3.5–4.7)
ALBUMIN SERPL-MCNC: 3.1 G/DL (ref 3.5–4.7)
ALPHA1 GLOB SERPL ELPH-MCNC: 0.3 G/DL (ref 0.2–0.4)
ALPHA1 GLOB SERPL ELPH-MCNC: 0.3 G/DL (ref 0.2–0.4)
ALPHA2 GLOB SERPL ELPH-MCNC: 0.6 G/DL (ref 0.5–1)
ALPHA2 GLOB SERPL ELPH-MCNC: 0.6 G/DL (ref 0.5–1)
ANION GAP SERPL CALCULATED.3IONS-SCNC: 12 MMOL/L (ref 7–16)
ANION GAP SERPL CALCULATED.3IONS-SCNC: 12 MMOL/L (ref 7–16)
APAP SERPL-MCNC: <5 UG/ML (ref 10–30)
APAP SERPL-MCNC: <5 UG/ML (ref 10–30)
B-GLOBULIN SERPL ELPH-MCNC: 0.9 G/DL (ref 0.8–1.3)
B-GLOBULIN SERPL ELPH-MCNC: 0.9 G/DL (ref 0.8–1.3)
BASOPHILS # BLD: 0.02 K/UL (ref 0–0.2)
BASOPHILS # BLD: 0.02 K/UL (ref 0–0.2)
BASOPHILS NFR BLD: 0 % (ref 0–2)
BASOPHILS NFR BLD: 0 % (ref 0–2)
BUN SERPL-MCNC: 9 MG/DL (ref 6–20)
BUN SERPL-MCNC: 9 MG/DL (ref 6–20)
CALCIUM SERPL-MCNC: 8.1 MG/DL (ref 8.6–10.2)
CALCIUM SERPL-MCNC: 8.1 MG/DL (ref 8.6–10.2)
CHLORIDE SERPL-SCNC: 106 MMOL/L (ref 98–107)
CHLORIDE SERPL-SCNC: 106 MMOL/L (ref 98–107)
CK SERPL-CCNC: 409 U/L (ref 20–180)
CK SERPL-CCNC: 409 U/L (ref 20–180)
CK SERPL-CCNC: 425 U/L (ref 20–180)
CK SERPL-CCNC: 425 U/L (ref 20–180)
CO2 SERPL-SCNC: 20 MMOL/L (ref 22–29)
CO2 SERPL-SCNC: 20 MMOL/L (ref 22–29)
CREAT SERPL-MCNC: 0.6 MG/DL (ref 0.5–1)
CREAT SERPL-MCNC: 0.6 MG/DL (ref 0.5–1)
EOSINOPHIL # BLD: 0.01 K/UL (ref 0.05–0.5)
EOSINOPHIL # BLD: 0.01 K/UL (ref 0.05–0.5)
EOSINOPHILS RELATIVE PERCENT: 0 % (ref 0–6)
EOSINOPHILS RELATIVE PERCENT: 0 % (ref 0–6)
ERYTHROCYTE [DISTWIDTH] IN BLOOD BY AUTOMATED COUNT: 11.9 % (ref 11.5–15)
ERYTHROCYTE [DISTWIDTH] IN BLOOD BY AUTOMATED COUNT: 11.9 % (ref 11.5–15)
ETHANOLAMINE SERPL-MCNC: <10 MG/DL (ref 0–0.08)
ETHANOLAMINE SERPL-MCNC: <10 MG/DL (ref 0–0.08)
GAMMA GLOB SERPL ELPH-MCNC: 1 G/DL (ref 0.7–1.6)
GAMMA GLOB SERPL ELPH-MCNC: 1 G/DL (ref 0.7–1.6)
GFR, ESTIMATED: >90 ML/MIN/1.73M2
GFR, ESTIMATED: >90 ML/MIN/1.73M2
GLUCOSE SERPL-MCNC: 90 MG/DL (ref 74–99)
GLUCOSE SERPL-MCNC: 90 MG/DL (ref 74–99)
HCG UR QL: NEGATIVE
HCG UR QL: NEGATIVE
HCT VFR BLD AUTO: 33.2 % (ref 34–48)
HCT VFR BLD AUTO: 33.2 % (ref 34–48)
HGB BLD-MCNC: 11.2 G/DL (ref 11.5–15.5)
HGB BLD-MCNC: 11.2 G/DL (ref 11.5–15.5)
IMM GRANULOCYTES # BLD AUTO: <0.03 K/UL (ref 0–0.58)
IMM GRANULOCYTES # BLD AUTO: <0.03 K/UL (ref 0–0.58)
IMM GRANULOCYTES NFR BLD: 0 % (ref 0–5)
IMM GRANULOCYTES NFR BLD: 0 % (ref 0–5)
LYMPHOCYTES NFR BLD: 3.55 K/UL (ref 1.5–4)
LYMPHOCYTES NFR BLD: 3.55 K/UL (ref 1.5–4)
LYMPHOCYTES RELATIVE PERCENT: 47 % (ref 20–42)
LYMPHOCYTES RELATIVE PERCENT: 47 % (ref 20–42)
MAGNESIUM SERPL-MCNC: 1.8 MG/DL (ref 1.6–2.6)
MAGNESIUM SERPL-MCNC: 1.8 MG/DL (ref 1.6–2.6)
MCH RBC QN AUTO: 30.9 PG (ref 26–35)
MCH RBC QN AUTO: 30.9 PG (ref 26–35)
MCHC RBC AUTO-ENTMCNC: 33.7 G/DL (ref 32–34.5)
MCHC RBC AUTO-ENTMCNC: 33.7 G/DL (ref 32–34.5)
MCV RBC AUTO: 91.7 FL (ref 80–99.9)
MCV RBC AUTO: 91.7 FL (ref 80–99.9)
MONOCYTES NFR BLD: 0.66 K/UL (ref 0.1–0.95)
MONOCYTES NFR BLD: 0.66 K/UL (ref 0.1–0.95)
MONOCYTES NFR BLD: 9 % (ref 2–12)
MONOCYTES NFR BLD: 9 % (ref 2–12)
NEUTROPHILS NFR BLD: 43 % (ref 43–80)
NEUTROPHILS NFR BLD: 43 % (ref 43–80)
NEUTS SEG NFR BLD: 3.24 K/UL (ref 1.8–7.3)
NEUTS SEG NFR BLD: 3.24 K/UL (ref 1.8–7.3)
PATHOLOGIST: ABNORMAL
PATHOLOGIST: ABNORMAL
PLATELET # BLD AUTO: 187 K/UL (ref 130–450)
PLATELET # BLD AUTO: 187 K/UL (ref 130–450)
PMV BLD AUTO: 9.7 FL (ref 7–12)
PMV BLD AUTO: 9.7 FL (ref 7–12)
POTASSIUM SERPL-SCNC: 3.4 MMOL/L (ref 3.5–5)
POTASSIUM SERPL-SCNC: 3.4 MMOL/L (ref 3.5–5)
PROT PATTERN SERPL ELPH-IMP: ABNORMAL
PROT PATTERN SERPL ELPH-IMP: ABNORMAL
PROT SERPL-MCNC: 6 G/DL (ref 6.4–8.3)
PROT SERPL-MCNC: 6 G/DL (ref 6.4–8.3)
RBC # BLD AUTO: 3.62 M/UL (ref 3.5–5.5)
RBC # BLD AUTO: 3.62 M/UL (ref 3.5–5.5)
SALICYLATES SERPL-MCNC: <0.3 MG/DL (ref 0–30)
SALICYLATES SERPL-MCNC: <0.3 MG/DL (ref 0–30)
SODIUM SERPL-SCNC: 138 MMOL/L (ref 132–146)
SODIUM SERPL-SCNC: 138 MMOL/L (ref 132–146)
TOXIC TRICYCLIC SC,BLOOD: NEGATIVE
TOXIC TRICYCLIC SC,BLOOD: NEGATIVE
WBC OTHER # BLD: 7.5 K/UL (ref 4.5–11.5)
WBC OTHER # BLD: 7.5 K/UL (ref 4.5–11.5)

## 2025-03-12 PROCEDURE — 6360000002 HC RX W HCPCS: Performed by: ANESTHESIOLOGY

## 2025-03-12 PROCEDURE — 6370000000 HC RX 637 (ALT 250 FOR IP)

## 2025-03-12 PROCEDURE — 6360000002 HC RX W HCPCS

## 2025-03-12 PROCEDURE — 84703 CHORIONIC GONADOTROPIN ASSAY: CPT

## 2025-03-12 PROCEDURE — 2500000003 HC RX 250 WO HCPCS

## 2025-03-12 PROCEDURE — C1713 ANCHOR/SCREW BN/BN,TIS/BN: HCPCS | Performed by: ORTHOPAEDIC SURGERY

## 2025-03-12 PROCEDURE — 36415 COLL VENOUS BLD VENIPUNCTURE: CPT

## 2025-03-12 PROCEDURE — 3600000012 HC SURGERY LEVEL 2 ADDTL 15MIN: Performed by: ORTHOPAEDIC SURGERY

## 2025-03-12 PROCEDURE — 3600000002 HC SURGERY LEVEL 2 BASE: Performed by: ORTHOPAEDIC SURGERY

## 2025-03-12 PROCEDURE — C1769 GUIDE WIRE: HCPCS | Performed by: ORTHOPAEDIC SURGERY

## 2025-03-12 PROCEDURE — 3E0T3BZ INTRODUCTION OF ANESTHETIC AGENT INTO PERIPHERAL NERVES AND PLEXI, PERCUTANEOUS APPROACH: ICD-10-PCS | Performed by: SURGERY

## 2025-03-12 PROCEDURE — 80048 BASIC METABOLIC PNL TOTAL CA: CPT

## 2025-03-12 PROCEDURE — 82550 ASSAY OF CK (CPK): CPT

## 2025-03-12 PROCEDURE — 2720000010 HC SURG SUPPLY STERILE: Performed by: ORTHOPAEDIC SURGERY

## 2025-03-12 PROCEDURE — 2709999900 HC NON-CHARGEABLE SUPPLY: Performed by: ORTHOPAEDIC SURGERY

## 2025-03-12 PROCEDURE — 3700000000 HC ANESTHESIA ATTENDED CARE: Performed by: ORTHOPAEDIC SURGERY

## 2025-03-12 PROCEDURE — 85025 COMPLETE CBC W/AUTO DIFF WBC: CPT

## 2025-03-12 PROCEDURE — 7100000001 HC PACU RECOVERY - ADDTL 15 MIN: Performed by: ORTHOPAEDIC SURGERY

## 2025-03-12 PROCEDURE — 6370000000 HC RX 637 (ALT 250 FOR IP): Performed by: ORTHOPAEDIC SURGERY

## 2025-03-12 PROCEDURE — 6360000002 HC RX W HCPCS: Performed by: ORTHOPAEDIC SURGERY

## 2025-03-12 PROCEDURE — 2580000003 HC RX 258

## 2025-03-12 PROCEDURE — 2500000003 HC RX 250 WO HCPCS: Performed by: ANESTHESIOLOGY

## 2025-03-12 PROCEDURE — 73620 X-RAY EXAM OF FOOT: CPT

## 2025-03-12 PROCEDURE — 83735 ASSAY OF MAGNESIUM: CPT

## 2025-03-12 PROCEDURE — 7100000000 HC PACU RECOVERY - FIRST 15 MIN: Performed by: ORTHOPAEDIC SURGERY

## 2025-03-12 PROCEDURE — 3700000001 HC ADD 15 MINUTES (ANESTHESIA): Performed by: ORTHOPAEDIC SURGERY

## 2025-03-12 PROCEDURE — 64445 NJX AA&/STRD SCIATIC NRV IMG: CPT | Performed by: ANESTHESIOLOGY

## 2025-03-12 PROCEDURE — 2500000003 HC RX 250 WO HCPCS: Performed by: ORTHOPAEDIC SURGERY

## 2025-03-12 PROCEDURE — 1200000000 HC SEMI PRIVATE

## 2025-03-12 DEVICE — SCREW BNE L44MM DIA4MM S STL CANN SHT 1/3 THRD SM HEX SOCK: Type: IMPLANTABLE DEVICE | Site: FOOT | Status: FUNCTIONAL

## 2025-03-12 DEVICE — SCREW BNE L36MM DIA4MM S STL CANN SHT 1/3 THRD SM HEX SOCK: Type: IMPLANTABLE DEVICE | Site: FOOT | Status: FUNCTIONAL

## 2025-03-12 RX ORDER — MAGNESIUM SULFATE IN WATER 40 MG/ML
2000 INJECTION, SOLUTION INTRAVENOUS ONCE
Status: COMPLETED | OUTPATIENT
Start: 2025-03-12 | End: 2025-03-13

## 2025-03-12 RX ORDER — ROPIVACAINE HYDROCHLORIDE 5 MG/ML
INJECTION, SOLUTION EPIDURAL; INFILTRATION; PERINEURAL
Status: COMPLETED | OUTPATIENT
Start: 2025-03-12 | End: 2025-03-12

## 2025-03-12 RX ORDER — PHENYLEPHRINE HCL IN 0.9% NACL 1 MG/10 ML
SYRINGE (ML) INTRAVENOUS
Status: DISCONTINUED | OUTPATIENT
Start: 2025-03-12 | End: 2025-03-12 | Stop reason: SDUPTHER

## 2025-03-12 RX ORDER — ROPIVACAINE HYDROCHLORIDE 5 MG/ML
25 INJECTION, SOLUTION EPIDURAL; INFILTRATION; PERINEURAL ONCE
Status: DISCONTINUED | OUTPATIENT
Start: 2025-03-12 | End: 2025-03-14 | Stop reason: HOSPADM

## 2025-03-12 RX ORDER — FENTANYL CITRATE 50 UG/ML
INJECTION, SOLUTION INTRAMUSCULAR; INTRAVENOUS
Status: DISCONTINUED | OUTPATIENT
Start: 2025-03-12 | End: 2025-03-12 | Stop reason: SDUPTHER

## 2025-03-12 RX ORDER — MIDAZOLAM HYDROCHLORIDE 2 MG/2ML
2 INJECTION, SOLUTION INTRAMUSCULAR; INTRAVENOUS ONCE
Status: COMPLETED | OUTPATIENT
Start: 2025-03-12 | End: 2025-03-12

## 2025-03-12 RX ORDER — ROCURONIUM BROMIDE 10 MG/ML
INJECTION, SOLUTION INTRAVENOUS
Status: DISCONTINUED | OUTPATIENT
Start: 2025-03-12 | End: 2025-03-12 | Stop reason: SDUPTHER

## 2025-03-12 RX ORDER — PROPOFOL 10 MG/ML
INJECTION, EMULSION INTRAVENOUS
Status: DISCONTINUED | OUTPATIENT
Start: 2025-03-12 | End: 2025-03-12 | Stop reason: SDUPTHER

## 2025-03-12 RX ORDER — LIDOCAINE HYDROCHLORIDE 20 MG/ML
INJECTION, SOLUTION INTRAVENOUS
Status: DISCONTINUED | OUTPATIENT
Start: 2025-03-12 | End: 2025-03-12 | Stop reason: SDUPTHER

## 2025-03-12 RX ORDER — ASPIRIN 81 MG/1
81 TABLET ORAL 2 TIMES DAILY
Qty: 60 TABLET | Refills: 0 | Status: SHIPPED | OUTPATIENT
Start: 2025-03-12

## 2025-03-12 RX ORDER — POTASSIUM CHLORIDE 1500 MG/1
20 TABLET, EXTENDED RELEASE ORAL ONCE
Status: COMPLETED | OUTPATIENT
Start: 2025-03-12 | End: 2025-03-12

## 2025-03-12 RX ORDER — HYDROMORPHONE HYDROCHLORIDE 1 MG/ML
0.5 INJECTION, SOLUTION INTRAMUSCULAR; INTRAVENOUS; SUBCUTANEOUS EVERY 5 MIN PRN
Status: DISCONTINUED | OUTPATIENT
Start: 2025-03-12 | End: 2025-03-13

## 2025-03-12 RX ORDER — SODIUM CHLORIDE 9 MG/ML
INJECTION, SOLUTION INTRAVENOUS
Status: DISCONTINUED | OUTPATIENT
Start: 2025-03-12 | End: 2025-03-12 | Stop reason: SDUPTHER

## 2025-03-12 RX ORDER — ONDANSETRON 2 MG/ML
4 INJECTION INTRAMUSCULAR; INTRAVENOUS
Status: ACTIVE | OUTPATIENT
Start: 2025-03-12 | End: 2025-03-13

## 2025-03-12 RX ORDER — HYDROMORPHONE HYDROCHLORIDE 1 MG/ML
0.25 INJECTION, SOLUTION INTRAMUSCULAR; INTRAVENOUS; SUBCUTANEOUS EVERY 5 MIN PRN
Status: DISCONTINUED | OUTPATIENT
Start: 2025-03-12 | End: 2025-03-13

## 2025-03-12 RX ORDER — ASPIRIN 81 MG/1
81 TABLET ORAL 2 TIMES DAILY
Status: DISCONTINUED | OUTPATIENT
Start: 2025-03-12 | End: 2025-03-14 | Stop reason: HOSPADM

## 2025-03-12 RX ORDER — DEXAMETHASONE SODIUM PHOSPHATE 10 MG/ML
INJECTION, SOLUTION INTRA-ARTICULAR; INTRALESIONAL; INTRAMUSCULAR; INTRAVENOUS; SOFT TISSUE
Status: DISCONTINUED | OUTPATIENT
Start: 2025-03-12 | End: 2025-03-12 | Stop reason: SDUPTHER

## 2025-03-12 RX ORDER — SODIUM CHLORIDE 9 MG/ML
INJECTION, SOLUTION INTRAVENOUS PRN
Status: DISCONTINUED | OUTPATIENT
Start: 2025-03-12 | End: 2025-03-14 | Stop reason: HOSPADM

## 2025-03-12 RX ORDER — KETOROLAC TROMETHAMINE 30 MG/ML
INJECTION, SOLUTION INTRAMUSCULAR; INTRAVENOUS
Status: DISCONTINUED | OUTPATIENT
Start: 2025-03-12 | End: 2025-03-12 | Stop reason: SDUPTHER

## 2025-03-12 RX ORDER — NALOXONE HYDROCHLORIDE 0.4 MG/ML
INJECTION, SOLUTION INTRAMUSCULAR; INTRAVENOUS; SUBCUTANEOUS PRN
Status: DISCONTINUED | OUTPATIENT
Start: 2025-03-12 | End: 2025-03-14 | Stop reason: HOSPADM

## 2025-03-12 RX ORDER — SODIUM CHLORIDE 0.9 % (FLUSH) 0.9 %
5-40 SYRINGE (ML) INJECTION PRN
Status: DISCONTINUED | OUTPATIENT
Start: 2025-03-12 | End: 2025-03-14 | Stop reason: HOSPADM

## 2025-03-12 RX ORDER — ONDANSETRON 2 MG/ML
INJECTION INTRAMUSCULAR; INTRAVENOUS
Status: DISCONTINUED | OUTPATIENT
Start: 2025-03-12 | End: 2025-03-12 | Stop reason: SDUPTHER

## 2025-03-12 RX ORDER — SODIUM CHLORIDE 0.9 % (FLUSH) 0.9 %
5-40 SYRINGE (ML) INJECTION EVERY 12 HOURS SCHEDULED
Status: DISCONTINUED | OUTPATIENT
Start: 2025-03-12 | End: 2025-03-14 | Stop reason: HOSPADM

## 2025-03-12 RX ORDER — OXYCODONE AND ACETAMINOPHEN 5; 325 MG/1; MG/1
1 TABLET ORAL EVERY 6 HOURS PRN
Qty: 28 TABLET | Refills: 0 | Status: SHIPPED | OUTPATIENT
Start: 2025-03-12 | End: 2025-03-19

## 2025-03-12 RX ADMIN — KETOROLAC TROMETHAMINE 15 MG: 30 INJECTION, SOLUTION INTRAMUSCULAR at 10:39

## 2025-03-12 RX ADMIN — ACETAMINOPHEN 650 MG: 325 TABLET ORAL at 22:32

## 2025-03-12 RX ADMIN — ONDANSETRON 4 MG: 2 INJECTION, SOLUTION INTRAMUSCULAR; INTRAVENOUS at 09:56

## 2025-03-12 RX ADMIN — Medication 100 MCG: at 10:12

## 2025-03-12 RX ADMIN — DEXAMETHASONE SODIUM PHOSPHATE 10 MG: 10 INJECTION INTRAMUSCULAR; INTRAVENOUS at 09:40

## 2025-03-12 RX ADMIN — SALINE NASAL SPRAY 1 SPRAY: 1.5 SOLUTION NASAL at 21:22

## 2025-03-12 RX ADMIN — FENTANYL CITRATE 100 MCG: 0.05 INJECTION, SOLUTION INTRAMUSCULAR; INTRAVENOUS at 09:30

## 2025-03-12 RX ADMIN — POTASSIUM CHLORIDE 20 MEQ: 1500 TABLET, EXTENDED RELEASE ORAL at 21:19

## 2025-03-12 RX ADMIN — MAGNESIUM SULFATE HEPTAHYDRATE 2000 MG: 40 INJECTION, SOLUTION INTRAVENOUS at 22:32

## 2025-03-12 RX ADMIN — ACETAMINOPHEN 650 MG: 325 TABLET ORAL at 00:02

## 2025-03-12 RX ADMIN — WATER 2000 MG: 1 INJECTION INTRAMUSCULAR; INTRAVENOUS; SUBCUTANEOUS at 16:58

## 2025-03-12 RX ADMIN — Medication 100 MCG: at 09:50

## 2025-03-12 RX ADMIN — POTASSIUM BICARBONATE 40 MEQ: 782 TABLET, EFFERVESCENT ORAL at 22:33

## 2025-03-12 RX ADMIN — SODIUM CHLORIDE, PRESERVATIVE FREE 10 ML: 5 INJECTION INTRAVENOUS at 20:45

## 2025-03-12 RX ADMIN — Medication 100 MCG: at 10:07

## 2025-03-12 RX ADMIN — OXYCODONE HYDROCHLORIDE 10 MG: 10 TABLET ORAL at 17:16

## 2025-03-12 RX ADMIN — BACITRACIN ZINC: 500 OINTMENT TOPICAL at 20:44

## 2025-03-12 RX ADMIN — SENNOSIDES AND DOCUSATE SODIUM 1 TABLET: 50; 8.6 TABLET ORAL at 20:44

## 2025-03-12 RX ADMIN — ASPIRIN 81 MG: 81 TABLET, COATED ORAL at 20:44

## 2025-03-12 RX ADMIN — ROCURONIUM BROMIDE 30 MG: 10 INJECTION INTRAVENOUS at 09:30

## 2025-03-12 RX ADMIN — OXYCODONE HYDROCHLORIDE 10 MG: 10 TABLET ORAL at 03:39

## 2025-03-12 RX ADMIN — BACITRACIN ZINC: 500 OINTMENT TOPICAL at 13:27

## 2025-03-12 RX ADMIN — Medication 100 MCG: at 09:55

## 2025-03-12 RX ADMIN — SODIUM CHLORIDE, PRESERVATIVE FREE 10 ML: 5 INJECTION INTRAVENOUS at 21:22

## 2025-03-12 RX ADMIN — SODIUM CHLORIDE 3000 MG: 9 INJECTION, SOLUTION INTRAVENOUS at 00:02

## 2025-03-12 RX ADMIN — ASPIRIN 81 MG: 81 TABLET, COATED ORAL at 13:27

## 2025-03-12 RX ADMIN — HYDROMORPHONE HYDROCHLORIDE 0.5 MG: 1 INJECTION, SOLUTION INTRAMUSCULAR; INTRAVENOUS; SUBCUTANEOUS at 22:32

## 2025-03-12 RX ADMIN — Medication 100 MCG: at 10:17

## 2025-03-12 RX ADMIN — MIDAZOLAM HYDROCHLORIDE 2 MG: 1 INJECTION, SOLUTION INTRAMUSCULAR; INTRAVENOUS at 08:44

## 2025-03-12 RX ADMIN — HYDROMORPHONE HYDROCHLORIDE 0.5 MG: 1 INJECTION, SOLUTION INTRAMUSCULAR; INTRAVENOUS; SUBCUTANEOUS at 06:27

## 2025-03-12 RX ADMIN — Medication 100 MCG: at 10:24

## 2025-03-12 RX ADMIN — LIDOCAINE HYDROCHLORIDE 50 MG: 20 INJECTION, SOLUTION INTRAVENOUS at 09:30

## 2025-03-12 RX ADMIN — ONDANSETRON 4 MG: 2 INJECTION, SOLUTION INTRAMUSCULAR; INTRAVENOUS at 07:32

## 2025-03-12 RX ADMIN — Medication 100 MCG: at 09:41

## 2025-03-12 RX ADMIN — OXYCODONE HYDROCHLORIDE 10 MG: 10 TABLET ORAL at 21:20

## 2025-03-12 RX ADMIN — PROPOFOL 120 MG: 10 INJECTION, EMULSION INTRAVENOUS at 09:30

## 2025-03-12 RX ADMIN — Medication 100 MCG: at 10:32

## 2025-03-12 RX ADMIN — BACITRACIN ZINC: 500 OINTMENT TOPICAL at 00:02

## 2025-03-12 RX ADMIN — METRONIDAZOLE 500 MG: 500 TABLET ORAL at 16:58

## 2025-03-12 RX ADMIN — SUGAMMADEX 113 MG: 100 INJECTION, SOLUTION INTRAVENOUS at 10:52

## 2025-03-12 RX ADMIN — ROPIVACAINE HYDROCHLORIDE 25 ML: 5 INJECTION EPIDURAL; INFILTRATION; PERINEURAL at 08:39

## 2025-03-12 RX ADMIN — SALINE NASAL SPRAY 1 SPRAY: 1.5 SOLUTION NASAL at 13:27

## 2025-03-12 RX ADMIN — SODIUM CHLORIDE: 9 INJECTION, SOLUTION INTRAVENOUS at 09:25

## 2025-03-12 RX ADMIN — METHOCARBAMOL 750 MG: 750 TABLET ORAL at 20:44

## 2025-03-12 ASSESSMENT — PAIN DESCRIPTION - DESCRIPTORS
DESCRIPTORS: ACHING;DISCOMFORT;SORE
DESCRIPTORS: DULL;ACHING;THROBBING
DESCRIPTORS: ACHING;DISCOMFORT;SORE

## 2025-03-12 ASSESSMENT — PAIN DESCRIPTION - FREQUENCY
FREQUENCY: INTERMITTENT

## 2025-03-12 ASSESSMENT — PAIN DESCRIPTION - ORIENTATION
ORIENTATION: RIGHT

## 2025-03-12 ASSESSMENT — PAIN SCALES - GENERAL
PAINLEVEL_OUTOF10: 3
PAINLEVEL_OUTOF10: 5
PAINLEVEL_OUTOF10: 7
PAINLEVEL_OUTOF10: 4
PAINLEVEL_OUTOF10: 3
PAINLEVEL_OUTOF10: 5
PAINLEVEL_OUTOF10: 7
PAINLEVEL_OUTOF10: 6

## 2025-03-12 ASSESSMENT — PAIN DESCRIPTION - ONSET
ONSET: GRADUAL

## 2025-03-12 ASSESSMENT — PAIN DESCRIPTION - PAIN TYPE
TYPE: ACUTE PAIN
TYPE: ACUTE PAIN
TYPE: SURGICAL PAIN
TYPE: SURGICAL PAIN

## 2025-03-12 ASSESSMENT — PAIN - FUNCTIONAL ASSESSMENT
PAIN_FUNCTIONAL_ASSESSMENT: PREVENTS OR INTERFERES SOME ACTIVE ACTIVITIES AND ADLS

## 2025-03-12 ASSESSMENT — PAIN DESCRIPTION - LOCATION
LOCATION: LEG
LOCATION: FOOT
LOCATION: LEG
LOCATION: FOOT

## 2025-03-12 ASSESSMENT — LIFESTYLE VARIABLES: SMOKING_STATUS: 1
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
## 2025-03-12 NOTE — PROGRESS NOTES
Patient examined at bedside.  She states she is much more comfortable in her splint and is having diminished tenderness to palpation.  Compartments remain soft compressible.  She continues to have good sensation distally in the first and second toe.  I discussion with her regarding her current CT and that she may not need operative intervention.  Discussed with her that the final decision will be discussed in the morning to which she is understandable and agreeable.    Kendall Jiménez DO, PGY1  Orthopaedic Surgery
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
## 2025-03-12 NOTE — PROGRESS NOTES
Bela Cifuentes was ordered lanadelumab-flyo which is a nonformulary medication.  This medication will need to be supplied by the patient as the pharmacy does not carry this non-formulary medication.    If the medication has not been administered by 1400 on the following day from the time the order was placed, a pharmacist will follow-up with the nurse of the patient to assess the capability of the patient to bring in the medication.    If it is determined that the patient cannot supply the medication and it is not available to be dispensed from the pharmacy, the provider will be notified.     Néstor Rabago, PharmD
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
## 2025-03-12 NOTE — DISCHARGE SUMMARY
exam:->trauma Decision Support Exception - unselect if not a suspected or confirmed emergency medical condition->Emergency Medical Condition (MA) What reading provider will be dictating this exam?->CRC; ORDERING SYSTEM PROVIDED HISTORY: trauma TECHNOLOGIST PROVIDED HISTORY: Has a \"code stroke\" or \"stroke alert\" been called?->No Reason for exam:->trauma Decision Support Exception - unselect if not a suspected or confirmed emergency medical condition->Emergency Medical Condition (MA) What reading provider will be dictating this exam?->CRC FINDINGS: CT HEAD: BRAIN/VENTRICLES: There is no acute intracranial hemorrhage, mass effect or midline shift. No abnormal extra-axial fluid collection.  The gray-white differentiation is maintained without evidence of an acute infarct.  There is no evidence of hydrocephalus. CT FACIAL BONES: FACIAL BONES: The maxilla, pterygoid plates and zygomatic arches are intact. The mandible is intact.  The mandibular condyles are normally situated.  The nasal bones and maxillary nasal processes are intact.  There is a comminuted nasal bone fracture with minimal displacement with air in the soft tissues. The nasal septum remains intact.  The spine of the maxilla is intact.  There is no evidence of a blowout fracture of the orbits.  There is mild mucosal thickening in left maxillary sinus. ORBITS: The globes appear intact.  The extraocular muscles, optic nerve sheath complexes and lacrimal glands appear unremarkable.  No retrobulbar hematoma or mass is seen.  The orbital walls and rims are intact. SINUSES/MASTOIDS: The paranasal sinuses and mastoid air cells are well aerated.  No acute fracture is seen. SOFT TISSUES: No acute abnormality of the visualized skull or soft tissues.     1. No acute intracranial findings. 2. Comminuted nasal bone fracture with minimal displacement with air in the soft tissues. 3. Mild mucosal thickening in the left maxillary sinus. No acute traumatic injury of the  with one of our PAs.     Call the office at [phone] or directions or with any questions.  Watch for these significant complications.  Call physician if they or any other problems occur:  Fever over 101°, redness, swelling or warmth at the operative site  Unrelieved nausea    Foul smelling or cloudy drainage at the operative site   Unrelieved pain    Blood soaked dressing. (Some oozing may be normal)     Numb, pale, blue, cold or tingling extremity        It is the Department of Orthopaedic Trauma's standard of practice that providers will de-escalate(wean) all patients from narcotic(opioid) medications during the post-operative period.   We provide multimodal pain control but opioid medications are tapered in all of our patients.  If patient requires referral to pain management for prolonged taper off of opioid pain medication we will facilitate this process.        Follow up:   [facility]  [address] [address] [phone]  Follow up in 1 week(s)      Ron Pathak DO  [address] [id number] [phone]    Follow up in 2 week(s)      Elke Espinosa DO  [address] [address] [phone]    Schedule an appointment as soon as possible for a visit in 2 week(s)  For follow up       Signed:  Jeffrey Salazar DO  3/14/2025  9:16 AM

## 2025-03-12 NOTE — ANESTHESIA PROCEDURE NOTES
Peripheral Block    Patient location during procedure: holding area  Reason for block: post-op pain management  Start time: 3/12/2025 8:39 AM  End time: 3/12/2025 8:43 AM  Staffing  Performed: anesthesiologist and resident/CRNA   Anesthesiologist: Yomaira Aponte MD  Resident/CRNA: Fermin Arevalo APRN - CRNA  Performed by: Fermin Arevalo APRN - CRNA  Authorized by: Yomaira Aponte MD    Peripheral Block   Patient position: supine  Prep: ChloraPrep  Provider prep: mask and sterile gloves  Patient monitoring: cardiac monitor, continuous pulse ox, frequent blood pressure checks, IV access, oxygen and responsive to questions  Block type: Sciatic  Popliteal  Laterality: right  Injection technique: single-shot  Guidance: ultrasound guided    Needle   Needle type: insulated echogenic nerve stimulator needle   Needle gauge: 20 G  Needle length: 10 cm  Assessment   Injection assessment: negative aspiration for heme, no paresthesia on injection, local visualized surrounding nerve on ultrasound and no intravascular symptoms  Paresthesia pain: immediately resolved  Slow fractionated injection: yes  Hemodynamics: stable  Outcomes: uncomplicated and patient tolerated procedure well    Medications Administered  ropivacaine (NAROPIN) injection 0.5% - Perineural   25 mL - 3/12/2025 8:39:00 AM

## 2025-03-12 NOTE — OP NOTE
Operative Note      Patient: Bela Cifuentes  YOB: 2005  MRN: [id number]    Date of Procedure: 3/12/2025    Pre-Op Diagnosis Codes:   Right foot homo lateral Lisfranc injury  Right first tarsometatarsal joint dislocation  Right second tarsometatarsal joint dislocation  Right third tarsometatarsal joint dislocation  Right fourth tarsometatarsal joint dislocation  Right fifth tarsometatarsal joint dislocation     Post-Op Diagnosis: Same       Procedure(s):  Right first tarsometatarsal joint dislocation open reduction with internal fixation  2. Right second tarsometatarsal joint dislocation open reduction with internal fixation  3. Right third tarsometatarsal joint dislocation closed reduction requiring manipulation  4. Right fourth tarsometatarsal joint dislocation closed reduction with percutaneous pin fixation  5. Right fifth tarsometatarsal joint dislocation closed reduction percutaneous pin fixation    Surgeon(s):  Ron Pathak DO    Assistant:   Resident: Armando Monge DO; Pelon Sandoval DO; Royce Zaman DO    Anesthesia: General    Estimated Blood Loss (mL): less than 50     Complications: None    Specimens:   * No specimens in log *    Implants:  Implant Name Type Inv. Item Serial No.  Lot No. LRB No. Used Action   SCREW BNE L36MM DIA4MM S STL MARGARET SHT 1/3 THRD SM HEX SOCK - [id number]  SCREW BNE L36MM DIA4MM S STL MARGARET SHT 1/3 THRD SM HEX SOCK  [employer]  Right 1 Implanted   SCREW BNE L44MM DIA4MM S STL MARGARET SHT 1/3 THRD SM HEX SOCK - [id number]  SCREW BNE L44MM DIA4MM S STL MARGARET SHT 1/3 THRD SM HEX SOCK  DEPU[employer]WD  Right 1 Implanted         Drains: * No LDAs found *    Findings:  Infection Present At Time Of Surgery (PATOS) (choose all levels that have infection present):  No infection present  Other Findings: no other    Detailed Description of Procedure:     Patient was seen and identified in the preoperative holding area the right lower  fourth and fifth TMT joints.  Fluoroscopic guidance was utilized and the lesser TMT joints appear to be well aligned in multiple imaging views.  Due to the initial instability we now stabilized the fourth and fifth TMT joints by placing percutaneous K wires under fluoroscopic guidance.  At this point we felt that appropriate duction stabilization of the injury, all TMT joints appear to be appropriately acceptably aligned in all views.  We then took our final fluoroscopy shots of the foot and were happy with reduction, screws, pins.  We then copiously irrigated all wounds and closed our dorsal wound with 2-0 Monocryl and nylons.  We then bent and cut our pins on the lateral fourth and fifth metatarsals, we applied nonadherent dressing to all wounds, soft dressing, posterior slab splint that was very well-padded.    Patient will be nonweightbearing right lower extremity follow-up with Dr. Pathak in office in 2 weeks, aspirin 81 twice daily for DVT prophylaxis pain management per prescribed.  Strict elevation and ice remain in splint.    Dr. Pathak was present for the entirety of the case    Electronically signed by Armando Monge DO on 3/12/2025     Orthopaedic Attending    I was present and scrubbed throughout the entire procedure    Electronically signed by   Ron Pathak DO  3/12/2025     NOTE: This report was transcribed using voice recognition software. Every effort was made to ensure accuracy; however, inadvertent computerized transcription errors may be present

## 2025-03-12 NOTE — ANESTHESIA PRE PROCEDURE
(If Applicable):   Lab Results   Component Value Date    PREGTESTUR NEGATIVE 03/12/2025        ABGs: No results found for: \"PHART\", \"PO2ART\", \"TIZ0MTC\", \"CJT0EPI\", \"BEART\", \"W4ZXAXNJ\"     Type & Screen (If Applicable):  Lab Results   Component Value Date    ABORH O POSITIVE 03/11/2025    LABANTI NEGATIVE 03/11/2025       Drug/Infectious Status (If Applicable):  No results found for: \"HIV\", \"HEPCAB\"    COVID-19 Screening (If Applicable): No results found for: \"COVID19\"        Anesthesia Evaluation  Patient summary reviewed   no history of anesthetic complications:   Airway: Mallampati: III  TM distance: >3 FB   Neck ROM: full  Mouth opening: < 3 FB   Dental:      Comment: Denies loose missing or chipped teeth    Pulmonary:normal exam  breath sounds clear to auscultation  (+)           current smoker (marijuana)          Patient did not smoke on day of surgery.                 Cardiovascular:Negative CV ROS  Exercise tolerance: good (>4 METS)          Rhythm: regular  Rate: normal                    Neuro/Psych:   Negative Neuro/Psych ROS              GI/Hepatic/Renal: Neg GI/Hepatic/Renal ROS            Endo/Other: Negative Endo/Other ROS                    Abdominal:             Vascular: negative vascular ROS.         Other Findings:             Anesthesia Plan      general     ASA 1       Induction: intravenous.    MIPS: Postoperative opioids intended and Prophylactic antiemetics administered.  Anesthetic plan and risks discussed with patient.    Use of blood products discussed with patient whom consented to blood products.    Plan discussed with attending.          Post-op pain plan if not by surgeon: single peripheral nerve block            BERENICE Mckenzie - CRNA   3/12/2025
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
## 2025-03-12 NOTE — PROGRESS NOTES
Department of Orthopedic Surgery  Resident Progress Note    Patient seen and examined.  She continues to have soreness to her right lower extremity but states that her pain is generally well-controlled.  Currently denies any new onset numbness, tingling, or paresthesias.  Denies any other orthopedic complaints this time.      VITALS:  /65   Pulse 90   Temp 98.9 °F (37.2 °C) (Temporal)   Resp 16   Ht 1.549 m (5' 1\")   Wt 56.7 kg (125 lb)   SpO2 99%   BMI 23.62 kg/m²     General: Alert and oriented x 3    MUSCULOSKELETAL:   right lower extremity:  Splint remains clean dry and intact  Dorsal window in her splint was opened, patient continues to have significant tenderness palpation over the dorsal aspect right foot but her compartments remain soft compressible  Mild swelling of the right foot present  Sensation remains intact to superficial peroneal and deep peroneal nerve distributions with motor intact to EHL  Sensation also intact to distal toes  Capillary refill <3 seconds  Distal extremity is warm and well-perfused    CBC:   Lab Results   Component Value Date/Time    WBC 9.2 03/11/2025 03:55 PM    HGB 11.7 03/11/2025 03:55 PM    HCT 34.7 03/11/2025 03:55 PM     03/11/2025 03:55 PM     PT/INR:    Lab Results   Component Value Date/Time    PROTIME 11.6 03/11/2025 03:55 PM    INR 1.1 03/11/2025 03:55 PM       ASSESSMENT  Lisfranc type a injury    PLAN    Patient is currently stable in splint with minimal swelling of the foot  Possible OR intervention today versus discharge home with current splint in place, will discuss with attending  Elevate right lower extremity  Apply ice for pain as well as swelling  All patient and family questions were sought and answered and patient is currently agreeable to plan      Kendall Jiménez DO, PGY1  Orthopaedic Surgery
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
## 2025-03-12 NOTE — PROGRESS NOTES
4 Eyes Skin Assessment     NAME:  Bela Cifuentes  YOB: 2005  MEDICAL RECORD NUMBER:  [id number]    The patient is being assessed for  Admission    I agree that at least one RN has performed a thorough Head to Toe Skin Assessment on the patient. ALL assessment sites listed below have been assessed.      Areas assessed by both nurses:    Head, Face, Ears, Shoulders, Back, Chest, Arms, Elbows, Hands, Sacrum. Buttock, Coccyx, Ischium, and Legs. Feet and Heels        Does the Patient have a Wound? No noted wound(s)       Tal Prevention initiated by RN: No  Wound Care Orders initiated by RN: No    Pressure Injury (Stage 3,4, Unstageable, DTI, NWPT, and Complex wounds) if present, place Wound referral order by RN under : No    New Ostomies, if present place, Ostomy referral order under : No     Nurse 1 eSignature: Electronically signed by Rola Mahan RN on 3/12/25 at 1:11 AM EDT    **SHARE this note so that the co-signing nurse can place an eSignature**    Nurse 2 eSignature: {Esignature:[id number]}

## 2025-03-12 NOTE — ANESTHESIA POSTPROCEDURE EVALUATION
Department of Anesthesiology  Postprocedure Note    Patient: Bela Cifuentes  MRN: [id number]  YOB: 2005  Date of evaluation: 3/12/2025    Procedure Summary       Date: 03/12/25 Room / Location: [address]    Anesthesia Start: 0924 Anesthesia Stop: 1105    Procedure: Open Reduction Internal Fixation of Right Lisfranc Type A Fracture (Right: Foot) Diagnosis:       Lisfranc dislocation, right, initial encounter      (Lisfranc dislocation, right, initial encounter [S93.324A])    Surgeons: Ron Pathak DO Responsible Provider: Yomaira Aponte MD    Anesthesia Type: General ASA Status: 1            Anesthesia Type: General    Shikha Phase I: Shikha Score: 8    Shikha Phase II:      Anesthesia Post Evaluation    Patient location during evaluation: PACU  Patient participation: complete - patient participated  Level of consciousness: awake  Pain score: 2  Airway patency: patent  Nausea & Vomiting: no nausea  Cardiovascular status: hemodynamically stable  Respiratory status: acceptable  Hydration status: stable  Multimodal analgesia pain management approach    No notable events documented.

## 2025-03-13 LAB
ANION GAP SERPL CALCULATED.3IONS-SCNC: 10 MMOL/L (ref 7–16)
ANION GAP SERPL CALCULATED.3IONS-SCNC: 10 MMOL/L (ref 7–16)
BASOPHILS # BLD: 0.01 K/UL (ref 0–0.2)
BASOPHILS # BLD: 0.01 K/UL (ref 0–0.2)
BASOPHILS NFR BLD: 0 % (ref 0–2)
BASOPHILS NFR BLD: 0 % (ref 0–2)
BUN SERPL-MCNC: 6 MG/DL (ref 6–20)
BUN SERPL-MCNC: 6 MG/DL (ref 6–20)
CALCIUM SERPL-MCNC: 8.2 MG/DL (ref 8.6–10.2)
CALCIUM SERPL-MCNC: 8.2 MG/DL (ref 8.6–10.2)
CHLORIDE SERPL-SCNC: 104 MMOL/L (ref 98–107)
CHLORIDE SERPL-SCNC: 104 MMOL/L (ref 98–107)
CK SERPL-CCNC: 411 U/L (ref 20–180)
CK SERPL-CCNC: 411 U/L (ref 20–180)
CO2 SERPL-SCNC: 22 MMOL/L (ref 22–29)
CO2 SERPL-SCNC: 22 MMOL/L (ref 22–29)
CREAT SERPL-MCNC: 0.6 MG/DL (ref 0.5–1)
CREAT SERPL-MCNC: 0.6 MG/DL (ref 0.5–1)
EOSINOPHIL # BLD: 0.01 K/UL (ref 0.05–0.5)
EOSINOPHIL # BLD: 0.01 K/UL (ref 0.05–0.5)
EOSINOPHILS RELATIVE PERCENT: 0 % (ref 0–6)
EOSINOPHILS RELATIVE PERCENT: 0 % (ref 0–6)
ERYTHROCYTE [DISTWIDTH] IN BLOOD BY AUTOMATED COUNT: 11.9 % (ref 11.5–15)
ERYTHROCYTE [DISTWIDTH] IN BLOOD BY AUTOMATED COUNT: 11.9 % (ref 11.5–15)
GFR, ESTIMATED: >90 ML/MIN/1.73M2
GFR, ESTIMATED: >90 ML/MIN/1.73M2
GLUCOSE SERPL-MCNC: 93 MG/DL (ref 74–99)
GLUCOSE SERPL-MCNC: 93 MG/DL (ref 74–99)
HCT VFR BLD AUTO: 33.5 % (ref 34–48)
HCT VFR BLD AUTO: 33.5 % (ref 34–48)
HGB BLD-MCNC: 11.3 G/DL (ref 11.5–15.5)
HGB BLD-MCNC: 11.3 G/DL (ref 11.5–15.5)
IMM GRANULOCYTES # BLD AUTO: <0.03 K/UL (ref 0–0.58)
IMM GRANULOCYTES # BLD AUTO: <0.03 K/UL (ref 0–0.58)
IMM GRANULOCYTES NFR BLD: 0 % (ref 0–5)
IMM GRANULOCYTES NFR BLD: 0 % (ref 0–5)
LYMPHOCYTES NFR BLD: 3.56 K/UL (ref 1.5–4)
LYMPHOCYTES NFR BLD: 3.56 K/UL (ref 1.5–4)
LYMPHOCYTES RELATIVE PERCENT: 44 % (ref 20–42)
LYMPHOCYTES RELATIVE PERCENT: 44 % (ref 20–42)
MCH RBC QN AUTO: 31.2 PG (ref 26–35)
MCH RBC QN AUTO: 31.2 PG (ref 26–35)
MCHC RBC AUTO-ENTMCNC: 33.7 G/DL (ref 32–34.5)
MCHC RBC AUTO-ENTMCNC: 33.7 G/DL (ref 32–34.5)
MCV RBC AUTO: 92.5 FL (ref 80–99.9)
MCV RBC AUTO: 92.5 FL (ref 80–99.9)
MONOCYTES NFR BLD: 0.7 K/UL (ref 0.1–0.95)
MONOCYTES NFR BLD: 0.7 K/UL (ref 0.1–0.95)
MONOCYTES NFR BLD: 9 % (ref 2–12)
MONOCYTES NFR BLD: 9 % (ref 2–12)
NEUTROPHILS NFR BLD: 47 % (ref 43–80)
NEUTROPHILS NFR BLD: 47 % (ref 43–80)
NEUTS SEG NFR BLD: 3.82 K/UL (ref 1.8–7.3)
NEUTS SEG NFR BLD: 3.82 K/UL (ref 1.8–7.3)
PLATELET # BLD AUTO: 184 K/UL (ref 130–450)
PLATELET # BLD AUTO: 184 K/UL (ref 130–450)
PMV BLD AUTO: 9.7 FL (ref 7–12)
PMV BLD AUTO: 9.7 FL (ref 7–12)
POTASSIUM SERPL-SCNC: 4.3 MMOL/L (ref 3.5–5)
POTASSIUM SERPL-SCNC: 4.3 MMOL/L (ref 3.5–5)
RBC # BLD AUTO: 3.62 M/UL (ref 3.5–5.5)
RBC # BLD AUTO: 3.62 M/UL (ref 3.5–5.5)
SODIUM SERPL-SCNC: 136 MMOL/L (ref 132–146)
SODIUM SERPL-SCNC: 136 MMOL/L (ref 132–146)
WBC OTHER # BLD: 8.1 K/UL (ref 4.5–11.5)
WBC OTHER # BLD: 8.1 K/UL (ref 4.5–11.5)

## 2025-03-13 PROCEDURE — 6370000000 HC RX 637 (ALT 250 FOR IP)

## 2025-03-13 PROCEDURE — 36415 COLL VENOUS BLD VENIPUNCTURE: CPT

## 2025-03-13 PROCEDURE — 85025 COMPLETE CBC W/AUTO DIFF WBC: CPT

## 2025-03-13 PROCEDURE — 2500000003 HC RX 250 WO HCPCS: Performed by: ANESTHESIOLOGY

## 2025-03-13 PROCEDURE — 1200000000 HC SEMI PRIVATE

## 2025-03-13 PROCEDURE — 80048 BASIC METABOLIC PNL TOTAL CA: CPT

## 2025-03-13 PROCEDURE — 97535 SELF CARE MNGMENT TRAINING: CPT

## 2025-03-13 PROCEDURE — 93005 ELECTROCARDIOGRAM TRACING: CPT

## 2025-03-13 PROCEDURE — 2500000003 HC RX 250 WO HCPCS

## 2025-03-13 PROCEDURE — 97530 THERAPEUTIC ACTIVITIES: CPT

## 2025-03-13 PROCEDURE — 6370000000 HC RX 637 (ALT 250 FOR IP): Performed by: ORTHOPAEDIC SURGERY

## 2025-03-13 PROCEDURE — 6360000002 HC RX W HCPCS

## 2025-03-13 PROCEDURE — 97161 PT EVAL LOW COMPLEX 20 MIN: CPT

## 2025-03-13 PROCEDURE — 2500000003 HC RX 250 WO HCPCS: Performed by: ORTHOPAEDIC SURGERY

## 2025-03-13 PROCEDURE — 6360000002 HC RX W HCPCS: Performed by: ORTHOPAEDIC SURGERY

## 2025-03-13 PROCEDURE — 82550 ASSAY OF CK (CPK): CPT

## 2025-03-13 PROCEDURE — 97165 OT EVAL LOW COMPLEX 30 MIN: CPT

## 2025-03-13 RX ORDER — ENOXAPARIN SODIUM 100 MG/ML
30 INJECTION SUBCUTANEOUS 2 TIMES DAILY
Status: DISCONTINUED | OUTPATIENT
Start: 2025-03-13 | End: 2025-03-14 | Stop reason: HOSPADM

## 2025-03-13 RX ORDER — LACTULOSE 10 G/15ML
20 SOLUTION ORAL 3 TIMES DAILY
Status: DISCONTINUED | OUTPATIENT
Start: 2025-03-13 | End: 2025-03-13

## 2025-03-13 RX ORDER — PROCHLORPERAZINE EDISYLATE 5 MG/ML
10 INJECTION INTRAMUSCULAR; INTRAVENOUS EVERY 6 HOURS PRN
Status: DISCONTINUED | OUTPATIENT
Start: 2025-03-13 | End: 2025-03-14 | Stop reason: HOSPADM

## 2025-03-13 RX ADMIN — BACITRACIN ZINC: 500 OINTMENT TOPICAL at 15:41

## 2025-03-13 RX ADMIN — BACITRACIN ZINC: 500 OINTMENT TOPICAL at 07:34

## 2025-03-13 RX ADMIN — OXYCODONE HYDROCHLORIDE 10 MG: 10 TABLET ORAL at 15:42

## 2025-03-13 RX ADMIN — LACTULOSE 20 G: 20 SOLUTION ORAL at 09:29

## 2025-03-13 RX ADMIN — OXYCODONE HYDROCHLORIDE 10 MG: 10 TABLET ORAL at 05:20

## 2025-03-13 RX ADMIN — WATER 2000 MG: 1 INJECTION INTRAMUSCULAR; INTRAVENOUS; SUBCUTANEOUS at 01:34

## 2025-03-13 RX ADMIN — SENNOSIDES AND DOCUSATE SODIUM 1 TABLET: 50; 8.6 TABLET ORAL at 07:36

## 2025-03-13 RX ADMIN — BACITRACIN ZINC: 500 OINTMENT TOPICAL at 21:49

## 2025-03-13 RX ADMIN — ACETAMINOPHEN 650 MG: 325 TABLET ORAL at 17:26

## 2025-03-13 RX ADMIN — ASPIRIN 81 MG: 81 TABLET, COATED ORAL at 07:35

## 2025-03-13 RX ADMIN — HYDROMORPHONE HYDROCHLORIDE 0.5 MG: 1 INJECTION, SOLUTION INTRAMUSCULAR; INTRAVENOUS; SUBCUTANEOUS at 07:36

## 2025-03-13 RX ADMIN — OXYCODONE HYDROCHLORIDE 10 MG: 10 TABLET ORAL at 20:10

## 2025-03-13 RX ADMIN — METHOCARBAMOL 750 MG: 750 TABLET ORAL at 07:35

## 2025-03-13 RX ADMIN — PROCHLORPERAZINE EDISYLATE 10 MG: 5 INJECTION INTRAMUSCULAR; INTRAVENOUS at 21:48

## 2025-03-13 RX ADMIN — METRONIDAZOLE 500 MG: 500 TABLET ORAL at 15:42

## 2025-03-13 RX ADMIN — METRONIDAZOLE 500 MG: 500 TABLET ORAL at 07:35

## 2025-03-13 RX ADMIN — LACTULOSE 20 G: 20 SOLUTION ORAL at 15:43

## 2025-03-13 RX ADMIN — ACETAMINOPHEN 650 MG: 325 TABLET ORAL at 05:20

## 2025-03-13 RX ADMIN — SALINE NASAL SPRAY 1 SPRAY: 1.5 SOLUTION NASAL at 07:36

## 2025-03-13 RX ADMIN — ENOXAPARIN SODIUM 30 MG: 100 INJECTION SUBCUTANEOUS at 07:34

## 2025-03-13 RX ADMIN — OXYCODONE HYDROCHLORIDE 10 MG: 10 TABLET ORAL at 09:29

## 2025-03-13 RX ADMIN — SODIUM CHLORIDE, PRESERVATIVE FREE 10 ML: 5 INJECTION INTRAVENOUS at 07:37

## 2025-03-13 RX ADMIN — METHOCARBAMOL 750 MG: 750 TABLET ORAL at 15:42

## 2025-03-13 RX ADMIN — SODIUM CHLORIDE, PRESERVATIVE FREE 10 ML: 5 INJECTION INTRAVENOUS at 19:49

## 2025-03-13 RX ADMIN — METHOCARBAMOL 750 MG: 750 TABLET ORAL at 11:46

## 2025-03-13 RX ADMIN — ONDANSETRON 4 MG: 2 INJECTION, SOLUTION INTRAMUSCULAR; INTRAVENOUS at 19:49

## 2025-03-13 RX ADMIN — ONDANSETRON 4 MG: 4 TABLET, ORALLY DISINTEGRATING ORAL at 05:20

## 2025-03-13 RX ADMIN — ACETAMINOPHEN 650 MG: 325 TABLET ORAL at 11:45

## 2025-03-13 RX ADMIN — SALINE NASAL SPRAY 1 SPRAY: 1.5 SOLUTION NASAL at 15:40

## 2025-03-13 RX ADMIN — SALINE NASAL SPRAY 1 SPRAY: 1.5 SOLUTION NASAL at 21:49

## 2025-03-13 RX ADMIN — ENOXAPARIN SODIUM 30 MG: 100 INJECTION SUBCUTANEOUS at 21:49

## 2025-03-13 RX ADMIN — SODIUM CHLORIDE, PRESERVATIVE FREE 10 ML: 5 INJECTION INTRAVENOUS at 21:49

## 2025-03-13 ASSESSMENT — PAIN SCALES - GENERAL
PAINLEVEL_OUTOF10: 7
PAINLEVEL_OUTOF10: 4
PAINLEVEL_OUTOF10: 7
PAINLEVEL_OUTOF10: 4
PAINLEVEL_OUTOF10: 2
PAINLEVEL_OUTOF10: 2
PAINLEVEL_OUTOF10: 7
PAINLEVEL_OUTOF10: 2
PAINLEVEL_OUTOF10: 7
PAINLEVEL_OUTOF10: 7

## 2025-03-13 ASSESSMENT — PAIN DESCRIPTION - LOCATION
LOCATION: LEG
LOCATION: LEG;FOOT
LOCATION: LEG;FOOT
LOCATION: LEG
LOCATION: LEG

## 2025-03-13 ASSESSMENT — PAIN DESCRIPTION - ONSET
ONSET: ON-GOING

## 2025-03-13 ASSESSMENT — PAIN DESCRIPTION - ORIENTATION
ORIENTATION: RIGHT

## 2025-03-13 ASSESSMENT — PAIN DESCRIPTION - PAIN TYPE
TYPE: SURGICAL PAIN

## 2025-03-13 ASSESSMENT — PAIN DESCRIPTION - DESCRIPTORS
DESCRIPTORS: ACHING;THROBBING;SHOOTING
DESCRIPTORS: ACHING;BURNING;THROBBING
DESCRIPTORS: SHOOTING;THROBBING;SORE
DESCRIPTORS: ACHING;BURNING;THROBBING
DESCRIPTORS: ACHING;BURNING;THROBBING

## 2025-03-13 ASSESSMENT — PAIN DESCRIPTION - FREQUENCY
FREQUENCY: INTERMITTENT
FREQUENCY: INTERMITTENT
FREQUENCY: CONTINUOUS
FREQUENCY: INTERMITTENT
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
## 2025-03-13 NOTE — PROGRESS NOTES
Department of Orthopedic Surgery  Resident Progress Note      1 Day Post-Op ORIF right Lisfranc.  Patient seen examined, resting comfortably in bed, no acute issues overnight.  Patient reports block is wearing off and has increased sensation about foot.  No other acute complaints.    VITALS:  /65   Pulse 84   Temp 98.7 °F (37.1 °C) (Temporal)   Resp 17   Ht 1.549 m (5' 1\")   Wt 56.7 kg (125 lb)   SpO2 98%   BMI 23.62 kg/m²     General: alert and oriented to person, place and time, well-developed and well-nourished, in no acute distress    MUSCULOSKELETAL:   right lower extremity:  Dressing in place, clean dry and intact  Lower leg compartment soft compressible  Sensation intact to light touch all toes  Capillary refill less than 2 seconds, foot warm well-perfused  Able to wiggle toes    CBC:   Lab Results   Component Value Date/Time    WBC 8.1 03/13/2025 02:18 AM    HGB 11.3 03/13/2025 02:18 AM    HCT 33.5 03/13/2025 02:18 AM     03/13/2025 02:18 AM       ASSESSMENT  Post-Op Diagnosis Codes:     * Lisfranc dislocation, right, initial encounter [S93.324A]     PLAN      Continue physical therapy and protocol: NWB -right LE  24 hour abx coverage -complete  Deep venous thrombosis prophylaxis -aspirin 81 mg twice daily, early mobilization  PT/OT  Pain Control: IV and PO, wean to p.o.  Monitor H&H  D/C Plan: PT OT, primary.  No further orthopedic surgery intervention planned.  Appropriate for discharge and outpatient follow-up with Dr. Pathak      Electronically signed by Royce Zaman DO on 3/13/2025 at 5:44 AM      Orthopaedic Attending    Case discussed and reviewed with the resident.  Agree with assessment and plans.    Electronically signed by   Ron Pathak DO  3/13/2025

## 2025-03-13 NOTE — PROGRESS NOTES
Perfect serve sent to Dr. Alexander about new med order clarification for mag and potassium replacement.

## 2025-03-13 NOTE — PROGRESS NOTES
Page regarding patient having chest pain when using bedside commode.  Patient said she was having a bowel movement when she began to have pain starting at around her left breast and into her sternum.  Describes it as both sharp and achy.  It is lessened now but persistent.  Denies palpitations or diaphoresis. Denies having this before. Does not feel similar to her previous pain associated with angioedema. Worked with PT today and did well. Pain improves with deep breath. Vitals are within normal limits. Patient on room air. CTAB. Mildly tender palpation at sternum and left cost margin. Abdomen is soft, non-distended, mild diffuse tenderness.    Likely musculoskeletal  Will obtain EKG  Continue to monitor

## 2025-03-13 NOTE — PLAN OF CARE
Problem: Discharge Planning  Goal: Discharge to home or other facility with appropriate resources  3/12/2025 2331 by Samantha Macias RN  Outcome: Progressing  3/12/2025 1645 by Livia Garcia RN  Outcome: Progressing     Problem: Pain  Goal: Verbalizes/displays adequate comfort level or baseline comfort level  3/12/2025 2331 by Samantha Macias, RN  Outcome: Progressing  3/12/2025 1645 by Livia Garcia RN  Outcome: Progressing
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
## 2025-03-13 NOTE — PROGRESS NOTES
PCP is Elke Espinosa DO  Office notified of admission.      Electronically signed by Iona Salazar RN on 3/13/2025 at 10:32 AM

## 2025-03-13 NOTE — CARE COORDINATION
3/13/2025social work transition of care planning  Sw followed up with pt at bedside. Plan remains for home. Pt will have a ride. Fww referral to [facility] dme.  Electronically signed by WAYNE Hampton on 3/13/2025 at 9:23 AM

## 2025-03-13 NOTE — PLAN OF CARE
Problem: Discharge Planning  Goal: Discharge to home or other facility with appropriate resources  3/13/2025 1013 by Alexandria Harris RN  Outcome: Progressing  3/12/2025 2331 by Samantha Macias RN  Outcome: Progressing     Problem: Pain  Goal: Verbalizes/displays adequate comfort level or baseline comfort level  3/13/2025 1013 by Alexandria Harris RN  Outcome: Progressing  3/12/2025 2331 by Samantha Macias, RN  Outcome: Progressing
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
## 2025-03-14 VITALS
HEIGHT: 61 IN | BODY MASS INDEX: 23.6 KG/M2 | DIASTOLIC BLOOD PRESSURE: 63 MMHG | DIASTOLIC BLOOD PRESSURE: 63 MMHG | TEMPERATURE: 97.1 F | RESPIRATION RATE: 14 BRPM | HEART RATE: 66 BPM | HEART RATE: 66 BPM | BODY MASS INDEX: 23.6 KG/M2 | RESPIRATION RATE: 14 BRPM | HEIGHT: 61 IN | WEIGHT: 125 LBS | WEIGHT: 125 LBS | SYSTOLIC BLOOD PRESSURE: 109 MMHG | SYSTOLIC BLOOD PRESSURE: 109 MMHG | OXYGEN SATURATION: 98 % | TEMPERATURE: 97.1 F | OXYGEN SATURATION: 98 %

## 2025-03-14 LAB
EKG ATRIAL RATE: 77 BPM
EKG ATRIAL RATE: 77 BPM
EKG P AXIS: 60 DEGREES
EKG P AXIS: 60 DEGREES
EKG P-R INTERVAL: 100 MS
EKG P-R INTERVAL: 100 MS
EKG Q-T INTERVAL: 386 MS
EKG Q-T INTERVAL: 386 MS
EKG QRS DURATION: 112 MS
EKG QRS DURATION: 112 MS
EKG QTC CALCULATION (BAZETT): 436 MS
EKG QTC CALCULATION (BAZETT): 436 MS
EKG R AXIS: -20 DEGREES
EKG R AXIS: -20 DEGREES
EKG T AXIS: 92 DEGREES
EKG T AXIS: 92 DEGREES
EKG VENTRICULAR RATE: 77 BPM
EKG VENTRICULAR RATE: 77 BPM

## 2025-03-14 PROCEDURE — 6370000000 HC RX 637 (ALT 250 FOR IP)

## 2025-03-14 PROCEDURE — 6370000000 HC RX 637 (ALT 250 FOR IP): Performed by: ORTHOPAEDIC SURGERY

## 2025-03-14 PROCEDURE — 2500000003 HC RX 250 WO HCPCS

## 2025-03-14 RX ADMIN — BACITRACIN ZINC: 500 OINTMENT TOPICAL at 10:17

## 2025-03-14 RX ADMIN — OXYCODONE HYDROCHLORIDE 10 MG: 10 TABLET ORAL at 06:10

## 2025-03-14 RX ADMIN — ACETAMINOPHEN 650 MG: 325 TABLET ORAL at 06:10

## 2025-03-14 RX ADMIN — METHOCARBAMOL 750 MG: 750 TABLET ORAL at 10:11

## 2025-03-14 RX ADMIN — METRONIDAZOLE 500 MG: 500 TABLET ORAL at 10:10

## 2025-03-14 RX ADMIN — ACETAMINOPHEN 650 MG: 325 TABLET ORAL at 12:25

## 2025-03-14 RX ADMIN — ASPIRIN 81 MG: 81 TABLET, COATED ORAL at 10:10

## 2025-03-14 RX ADMIN — METHOCARBAMOL 750 MG: 750 TABLET ORAL at 12:25

## 2025-03-14 RX ADMIN — OXYCODONE HYDROCHLORIDE 10 MG: 10 TABLET ORAL at 02:24

## 2025-03-14 RX ADMIN — OXYCODONE 5 MG: 5 TABLET ORAL at 12:19

## 2025-03-14 RX ADMIN — SODIUM CHLORIDE, PRESERVATIVE FREE 10 ML: 5 INJECTION INTRAVENOUS at 10:12

## 2025-03-14 ASSESSMENT — PAIN DESCRIPTION - FREQUENCY
FREQUENCY: INTERMITTENT
FREQUENCY: INTERMITTENT

## 2025-03-14 ASSESSMENT — PAIN DESCRIPTION - ORIENTATION
ORIENTATION: RIGHT
ORIENTATION: RIGHT

## 2025-03-14 ASSESSMENT — PAIN SCALES - GENERAL
PAINLEVEL_OUTOF10: 7
PAINLEVEL_OUTOF10: 2
PAINLEVEL_OUTOF10: 7
PAINLEVEL_OUTOF10: 4

## 2025-03-14 ASSESSMENT — PAIN - FUNCTIONAL ASSESSMENT
PAIN_FUNCTIONAL_ASSESSMENT: PREVENTS OR INTERFERES SOME ACTIVE ACTIVITIES AND ADLS
PAIN_FUNCTIONAL_ASSESSMENT: PREVENTS OR INTERFERES SOME ACTIVE ACTIVITIES AND ADLS

## 2025-03-14 ASSESSMENT — PAIN DESCRIPTION - DESCRIPTORS
DESCRIPTORS: ACHING;BURNING;DISCOMFORT
DESCRIPTORS: BURNING;THROBBING;SHOOTING

## 2025-03-14 ASSESSMENT — PAIN DESCRIPTION - LOCATION
LOCATION: FOOT;LEG
LOCATION: FOOT;LEG

## 2025-03-14 ASSESSMENT — PAIN DESCRIPTION - ONSET
ONSET: AWAKENED FROM SLEEP
ONSET: AWAKENED FROM SLEEP

## 2025-03-14 ASSESSMENT — PAIN DESCRIPTION - PAIN TYPE
TYPE: SURGICAL PAIN
TYPE: SURGICAL PAIN
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
## 2025-03-14 NOTE — PROGRESS NOTES
Department of Orthopedic Surgery  Resident Progress Note      2 Days Post-Op ORIF right Lisfranc.  Patient seen and examined, resting comfortably bed.  No acute issues over    VITALS:  /65   Pulse 66   Temp 97.8 °F (36.6 °C) (Temporal)   Resp 16   Ht 1.549 m (5' 1\")   Wt 56.7 kg (125 lb)   SpO2 97%   BMI 23.62 kg/m²     General: alert and oriented to person, place and time, well-developed and well-nourished, in no acute distress    MUSCULOSKELETAL:   right lower extremity:  Dressing in place, clean dry and intact  Lower leg compartment soft compressible  Sensation intact to light touch all toes  Capillary refill less than 2 seconds, foot warm well-perfused  Able to wiggle toes    CBC:   Lab Results   Component Value Date/Time    WBC 8.1 03/13/2025 02:18 AM    HGB 11.3 03/13/2025 02:18 AM    HCT 33.5 03/13/2025 02:18 AM     03/13/2025 02:18 AM       ASSESSMENT  Post-Op Diagnosis Codes:     * Lisfranc dislocation, right, initial encounter [S93.324A]     PLAN      Continue physical therapy and protocol: NWB -right LE  24 hour abx coverage -complete  Deep venous thrombosis prophylaxis -aspirin 81 mg twice daily, early mobilization  PT/OT  Pain Control: IV and PO, wean to p.o.  Monitor H&H  D/C Plan: PT OT, primary.  No further orthopedic surgery intervention planned.  Appropriate for discharge and outpatient follow-up with Dr. Pathak  Orthopedic surgery will follow from periphery for remainder of visit, please contact with questions or concerns       Electronically signed by Royce Zaman DO on 3/14/2025 at 7:27 AM

## 2025-03-14 NOTE — PROGRESS NOTES
Paged regarding patient having nausea and vomiting.  Patient states that she developed nausea and threw up her dinner consisting of cheeseburger.  Started to feel nauseous while using bedside commode.  Denies abdominal pain.  Previous chest pain has resolved.  Patient resting in bed, no acute distress.  Vital signs within normal limits.  Abdomen is soft, nondistended, mild tenderness left lower quadrant.    Zofran was not helping nausea, add Compazine  EKG read pending  Stat CBC, CMP, lactic acid  Continue to monitor for abdominal pain, nausea, vomiting  Please page if continues, will continue to monitor

## 2025-03-14 NOTE — PLAN OF CARE
Problem: Pain  Goal: Verbalizes/displays adequate comfort level or baseline comfort level  3/13/2025 2313 by Nieves Smith, RN  Outcome: Progressing  3/13/2025 1013 by Alexandria Harris, RN  Outcome: Progressing

## 2025-03-14 NOTE — CARE COORDINATION
3/14/2025social work transition of care planning  Pt plan is home,fww delivered to room.  Electronically signed by WAYNE Hampton on 3/14/2025 at 9:28 AM
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
## 2025-03-17 ENCOUNTER — APPOINTMENT (OUTPATIENT)
Dept: ALLERGY | Facility: CLINIC | Age: 20
End: 2025-03-17
Payer: COMMERCIAL

## 2025-03-17 DIAGNOSIS — L50.8 CHRONIC URTICARIA: ICD-10-CM

## 2025-03-17 DIAGNOSIS — D84.1: Primary | ICD-10-CM

## 2025-03-17 PROCEDURE — 99215 OFFICE O/P EST HI 40 MIN: CPT | Performed by: STUDENT IN AN ORGANIZED HEALTH CARE EDUCATION/TRAINING PROGRAM

## 2025-03-17 RX ORDER — CETIRIZINE HYDROCHLORIDE 10 MG/1
10 TABLET ORAL DAILY
Qty: 60 TABLET | Refills: 0 | Status: SHIPPED | OUTPATIENT
Start: 2025-03-17 | End: 2025-05-16

## 2025-03-19 ENCOUNTER — TELEPHONE (OUTPATIENT)
Dept: ORTHOPEDIC SURGERY | Age: 20
End: 2025-03-19

## 2025-03-19 NOTE — TELEPHONE ENCOUNTER
PT called back, she is concerned about a clicking feeling she has in her foot and would like to discuss her concerns.
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
## 2025-03-21 ENCOUNTER — PREP FOR PROCEDURE (OUTPATIENT)
Dept: ENT CLINIC | Age: 20
End: 2025-03-21

## 2025-03-21 ENCOUNTER — OFFICE VISIT (OUTPATIENT)
Dept: ENT CLINIC | Age: 20
End: 2025-03-21
Payer: COMMERCIAL

## 2025-03-21 VITALS
BODY MASS INDEX: 23.6 KG/M2 | HEIGHT: 61 IN | SYSTOLIC BLOOD PRESSURE: 113 MMHG | WEIGHT: 125 LBS | DIASTOLIC BLOOD PRESSURE: 76 MMHG | HEART RATE: 69 BPM | TEMPERATURE: 97.7 F

## 2025-03-21 DIAGNOSIS — S02.2XXB OPEN DISPLACED FRACTURE OF NASAL BONE, INITIAL ENCOUNTER: Primary | ICD-10-CM

## 2025-03-21 DIAGNOSIS — J34.3 NASAL TURBINATE HYPERTROPHY: ICD-10-CM

## 2025-03-21 DIAGNOSIS — R09.81 NASAL CONGESTION: ICD-10-CM

## 2025-03-21 PROBLEM — S02.2XXA NASAL BONE FRACTURE: Status: ACTIVE | Noted: 2025-03-21

## 2025-03-21 PROCEDURE — 99214 OFFICE O/P EST MOD 30 MIN: CPT | Performed by: OTOLARYNGOLOGY

## 2025-03-21 PROCEDURE — G8420 CALC BMI NORM PARAMETERS: HCPCS | Performed by: OTOLARYNGOLOGY

## 2025-03-21 PROCEDURE — 1111F DSCHRG MED/CURRENT MED MERGE: CPT | Performed by: OTOLARYNGOLOGY

## 2025-03-21 PROCEDURE — 1036F TOBACCO NON-USER: CPT | Performed by: OTOLARYNGOLOGY

## 2025-03-21 PROCEDURE — G8427 DOCREV CUR MEDS BY ELIG CLIN: HCPCS | Performed by: OTOLARYNGOLOGY

## 2025-03-21 RX ORDER — OXYCODONE AND ACETAMINOPHEN 5; 325 MG/1; MG/1
1 TABLET ORAL EVERY 6 HOURS PRN
COMMUNITY

## 2025-03-21 ASSESSMENT — ENCOUNTER SYMPTOMS
FACIAL SWELLING: 1
RHINORRHEA: 0
ALLERGIC/IMMUNOLOGIC NEGATIVE: 1
RESPIRATORY NEGATIVE: 1
SORE THROAT: 0
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
## 2025-03-24 ENCOUNTER — ANESTHESIA EVENT (OUTPATIENT)
Dept: OPERATING ROOM | Age: 20
End: 2025-03-24
Payer: COMMERCIAL

## 2025-03-24 ENCOUNTER — PREP FOR PROCEDURE (OUTPATIENT)
Dept: ENT CLINIC | Age: 20
End: 2025-03-24

## 2025-03-24 ENCOUNTER — TELEPHONE (OUTPATIENT)
Dept: ENT CLINIC | Age: 20
End: 2025-03-24

## 2025-03-24 NOTE — TELEPHONE ENCOUNTER
Patient called office stating that [facility] canceled sx for 3/25/25 due to hx angioedema hereditary.    Informed patient that would contact [facility] and find out what is going on     [facility] did cx sx    Spoke with Justine scheduling and patient is on for [facility] 3/25/25

## 2025-03-24 NOTE — TELEPHONE ENCOUNTER
Patient aware that sx is now at [facility] 3/25/25.  Informed patient that she is to be at 1130 and arrival @ 0930.  Informed patient that she should receive a call from [facility] with final details.  Patient states that she understands

## 2025-03-24 NOTE — PROGRESS NOTES
[facility] PRE-ADMISSION TESTING INSTRUCTIONS    The Preadmission Testing patient is instructed accordingly using the following criteria (check applicable):    ARRIVAL INSTRUCTIONS:   Arrival Time: 0930    [x] Parking the day of Surgery is located in the Main Entrance lot.  Upon entering through the main entrance (Entrance A) make an immediate right to the surgery reception desk    [x] Bring photo ID and insurance card    [x] Please be sure to arrange for a responsible adult to provide transportation to and from the hospital    [x] Please arrange for a responsible adult to be with you for the 24 hour period post procedure, due to having anesthesia    [x] Please notify surgeon if you develop any illness between now and time of surgery (cold, cough, sore throat, fever, nausea, vomiting) or any signs of infections  including skin, wounds, and dental.    [x] If you awake am of surgery not feeling well or have temperature >100 please call [phone].    GENERAL INSTRUCTIONS:    [x] No solid foods after midnight, may have up to 8oz of water until 4 hours prior to surgery. No gum, no candy, no mints.  NPO time: 0730       [x] You may brush your teeth    [x] Take medications as instructed (Read back and verified by patient)   Take Percocet if needed.    [x] Bring Berinert day of surgery    [x] Stop herbal supplements and vitamins 5 days prior to procedure    [x] Follow preop dosing of blood thinners per physician instructions    [x] Bring urine specimen day of surgery    [x] Shower or bath with soap, lather and rinse well, AM of Surgery, no lotion, powders or creams to surgical site    [x] Please do not wear any nail polish, make up, hair products, body spray, aftershave, cologne or perfume on the day of surgery    [x] Jewelry, body piercings, eyeglasses, contact lenses and dentures are not permitted into surgery (bring cases)    [x] No tobacco products within 24 hours of surgery     [x] No

## 2025-03-25 ENCOUNTER — ANESTHESIA (OUTPATIENT)
Dept: OPERATING ROOM | Age: 20
End: 2025-03-25
Payer: COMMERCIAL

## 2025-03-25 ENCOUNTER — HOSPITAL ENCOUNTER (OUTPATIENT)
Age: 20
Setting detail: OUTPATIENT SURGERY
Discharge: HOME | End: 2025-03-25
Attending: OTOLARYNGOLOGY | Admitting: OTOLARYNGOLOGY
Payer: COMMERCIAL

## 2025-03-25 VITALS
HEIGHT: 61 IN | BODY MASS INDEX: 23.6 KG/M2 | SYSTOLIC BLOOD PRESSURE: 113 MMHG | RESPIRATION RATE: 16 BRPM | HEART RATE: 78 BPM | OXYGEN SATURATION: 99 % | WEIGHT: 125 LBS | TEMPERATURE: 97.2 F | DIASTOLIC BLOOD PRESSURE: 73 MMHG

## 2025-03-25 DIAGNOSIS — S02.2XXB OPEN FRACTURE OF NASAL BONE, INITIAL ENCOUNTER: Primary | ICD-10-CM

## 2025-03-25 DIAGNOSIS — S92.901D CLOSED FRACTURE OF RIGHT FOOT WITH ROUTINE HEALING, SUBSEQUENT ENCOUNTER: Primary | ICD-10-CM

## 2025-03-25 LAB
HCG, URINE, POC: NEGATIVE
Lab: NORMAL
NEGATIVE QC PASS/FAIL: NORMAL
POSITIVE QC PASS/FAIL: NORMAL

## 2025-03-25 PROCEDURE — 3600000002 HC SURGERY LEVEL 2 BASE: Performed by: OTOLARYNGOLOGY

## 2025-03-25 PROCEDURE — 3600000012 HC SURGERY LEVEL 2 ADDTL 15MIN: Performed by: OTOLARYNGOLOGY

## 2025-03-25 PROCEDURE — 6370000000 HC RX 637 (ALT 250 FOR IP): Performed by: ANESTHESIOLOGY

## 2025-03-25 PROCEDURE — 6370000000 HC RX 637 (ALT 250 FOR IP)

## 2025-03-25 PROCEDURE — 7100000000 HC PACU RECOVERY - FIRST 15 MIN: Performed by: OTOLARYNGOLOGY

## 2025-03-25 PROCEDURE — 3700000000 HC ANESTHESIA ATTENDED CARE: Performed by: OTOLARYNGOLOGY

## 2025-03-25 PROCEDURE — 2580000003 HC RX 258

## 2025-03-25 PROCEDURE — 6370000000 HC RX 637 (ALT 250 FOR IP): Performed by: OTOLARYNGOLOGY

## 2025-03-25 PROCEDURE — 7100000001 HC PACU RECOVERY - ADDTL 15 MIN: Performed by: OTOLARYNGOLOGY

## 2025-03-25 PROCEDURE — 6360000002 HC RX W HCPCS

## 2025-03-25 PROCEDURE — 7100000010 HC PHASE II RECOVERY - FIRST 15 MIN: Performed by: OTOLARYNGOLOGY

## 2025-03-25 PROCEDURE — 2500000003 HC RX 250 WO HCPCS

## 2025-03-25 PROCEDURE — 3700000001 HC ADD 15 MINUTES (ANESTHESIA): Performed by: OTOLARYNGOLOGY

## 2025-03-25 PROCEDURE — 7100000011 HC PHASE II RECOVERY - ADDTL 15 MIN: Performed by: OTOLARYNGOLOGY

## 2025-03-25 PROCEDURE — 2709999900 HC NON-CHARGEABLE SUPPLY: Performed by: OTOLARYNGOLOGY

## 2025-03-25 RX ORDER — ROCURONIUM BROMIDE 10 MG/ML
INJECTION, SOLUTION INTRAVENOUS
Status: DISCONTINUED | OUTPATIENT
Start: 2025-03-25 | End: 2025-03-25 | Stop reason: SDUPTHER

## 2025-03-25 RX ORDER — SODIUM CHLORIDE 0.9 % (FLUSH) 0.9 %
5-40 SYRINGE (ML) INJECTION PRN
Status: DISCONTINUED | OUTPATIENT
Start: 2025-03-25 | End: 2025-03-25 | Stop reason: HOSPADM

## 2025-03-25 RX ORDER — SODIUM CHLORIDE 9 MG/ML
INJECTION, SOLUTION INTRAVENOUS PRN
Status: DISCONTINUED | OUTPATIENT
Start: 2025-03-25 | End: 2025-03-25 | Stop reason: HOSPADM

## 2025-03-25 RX ORDER — FENTANYL CITRATE 50 UG/ML
INJECTION, SOLUTION INTRAMUSCULAR; INTRAVENOUS
Status: DISCONTINUED | OUTPATIENT
Start: 2025-03-25 | End: 2025-03-25 | Stop reason: SDUPTHER

## 2025-03-25 RX ORDER — SODIUM CHLORIDE 0.9 % (FLUSH) 0.9 %
5-40 SYRINGE (ML) INJECTION EVERY 12 HOURS SCHEDULED
Status: DISCONTINUED | OUTPATIENT
Start: 2025-03-25 | End: 2025-03-25 | Stop reason: HOSPADM

## 2025-03-25 RX ORDER — SODIUM CHLORIDE 9 MG/ML
INJECTION, SOLUTION INTRAVENOUS
Status: DISCONTINUED | OUTPATIENT
Start: 2025-03-25 | End: 2025-03-25 | Stop reason: SDUPTHER

## 2025-03-25 RX ORDER — MIDAZOLAM HYDROCHLORIDE 1 MG/ML
INJECTION, SOLUTION INTRAMUSCULAR; INTRAVENOUS
Status: DISCONTINUED | OUTPATIENT
Start: 2025-03-25 | End: 2025-03-25 | Stop reason: SDUPTHER

## 2025-03-25 RX ORDER — LIDOCAINE HYDROCHLORIDE 20 MG/ML
INJECTION, SOLUTION EPIDURAL; INFILTRATION; INTRACAUDAL; PERINEURAL
Status: DISCONTINUED | OUTPATIENT
Start: 2025-03-25 | End: 2025-03-25 | Stop reason: SDUPTHER

## 2025-03-25 RX ORDER — OXYMETAZOLINE HYDROCHLORIDE 0.05 G/100ML
2 SPRAY NASAL
Status: COMPLETED | OUTPATIENT
Start: 2025-03-25 | End: 2025-03-25

## 2025-03-25 RX ORDER — NALOXONE HYDROCHLORIDE 0.4 MG/ML
INJECTION, SOLUTION INTRAMUSCULAR; INTRAVENOUS; SUBCUTANEOUS
Status: DISCONTINUED | OUTPATIENT
Start: 2025-03-25 | End: 2025-03-25 | Stop reason: SDUPTHER

## 2025-03-25 RX ORDER — NALOXONE HYDROCHLORIDE 0.4 MG/ML
INJECTION, SOLUTION INTRAMUSCULAR; INTRAVENOUS; SUBCUTANEOUS PRN
Status: DISCONTINUED | OUTPATIENT
Start: 2025-03-25 | End: 2025-03-25 | Stop reason: HOSPADM

## 2025-03-25 RX ORDER — DEXAMETHASONE SODIUM PHOSPHATE 4 MG/ML
INJECTION, SOLUTION INTRA-ARTICULAR; INTRALESIONAL; INTRAMUSCULAR; INTRAVENOUS; SOFT TISSUE
Status: DISCONTINUED | OUTPATIENT
Start: 2025-03-25 | End: 2025-03-25 | Stop reason: SDUPTHER

## 2025-03-25 RX ORDER — OXYMETAZOLINE HYDROCHLORIDE 0.05 G/100ML
SPRAY NASAL PRN
Status: DISCONTINUED | OUTPATIENT
Start: 2025-03-25 | End: 2025-03-25 | Stop reason: ALTCHOICE

## 2025-03-25 RX ORDER — PROCHLORPERAZINE EDISYLATE 5 MG/ML
5 INJECTION INTRAMUSCULAR; INTRAVENOUS
Status: DISCONTINUED | OUTPATIENT
Start: 2025-03-25 | End: 2025-03-25 | Stop reason: HOSPADM

## 2025-03-25 RX ORDER — OXYCODONE AND ACETAMINOPHEN 5; 325 MG/1; MG/1
1 TABLET ORAL ONCE
Refills: 0 | Status: COMPLETED | OUTPATIENT
Start: 2025-03-25 | End: 2025-03-25

## 2025-03-25 RX ORDER — PROPOFOL 10 MG/ML
INJECTION, EMULSION INTRAVENOUS
Status: DISCONTINUED | OUTPATIENT
Start: 2025-03-25 | End: 2025-03-25 | Stop reason: SDUPTHER

## 2025-03-25 RX ORDER — ONDANSETRON 2 MG/ML
INJECTION INTRAMUSCULAR; INTRAVENOUS
Status: DISCONTINUED | OUTPATIENT
Start: 2025-03-25 | End: 2025-03-25 | Stop reason: SDUPTHER

## 2025-03-25 RX ADMIN — MIDAZOLAM 2 MG: 1 INJECTION INTRAMUSCULAR; INTRAVENOUS at 11:21

## 2025-03-25 RX ADMIN — NALXONE HYDROCHLORIDE 0.1 MG: 0.4 INJECTION INTRAMUSCULAR; INTRAVENOUS; SUBCUTANEOUS at 11:51

## 2025-03-25 RX ADMIN — OXYCODONE HYDROCHLORIDE AND ACETAMINOPHEN 1 TABLET: 5; 325 TABLET ORAL at 13:22

## 2025-03-25 RX ADMIN — DEXAMETHASONE SODIUM PHOSPHATE 10 MG: 4 INJECTION, SOLUTION INTRAMUSCULAR; INTRAVENOUS at 11:35

## 2025-03-25 RX ADMIN — ROCURONIUM BROMIDE 50 MG: 50 INJECTION INTRAVENOUS at 11:30

## 2025-03-25 RX ADMIN — SUGAMMADEX 227 MG: 100 INJECTION, SOLUTION INTRAVENOUS at 11:48

## 2025-03-25 RX ADMIN — FENTANYL CITRATE 100 MCG: 50 INJECTION, SOLUTION INTRAMUSCULAR; INTRAVENOUS at 11:38

## 2025-03-25 RX ADMIN — OXYMETAZOLINE HYDROCHLORIDE 2 SPRAY: 0.5 SPRAY NASAL at 10:15

## 2025-03-25 RX ADMIN — LIDOCAINE HYDROCHLORIDE 5 ML: 20 INJECTION, SOLUTION EPIDURAL; INFILTRATION; INTRACAUDAL; PERINEURAL at 11:30

## 2025-03-25 RX ADMIN — NALXONE HYDROCHLORIDE 0.1 MG: 0.4 INJECTION INTRAMUSCULAR; INTRAVENOUS; SUBCUTANEOUS at 11:55

## 2025-03-25 RX ADMIN — PROPOFOL 200 MG: 10 INJECTION, EMULSION INTRAVENOUS at 11:30

## 2025-03-25 RX ADMIN — SODIUM CHLORIDE: 9 INJECTION, SOLUTION INTRAVENOUS at 11:30

## 2025-03-25 RX ADMIN — FENTANYL CITRATE 100 MCG: 50 INJECTION, SOLUTION INTRAMUSCULAR; INTRAVENOUS at 11:30

## 2025-03-25 RX ADMIN — ONDANSETRON 4 MG: 2 INJECTION, SOLUTION INTRAMUSCULAR; INTRAVENOUS at 11:36

## 2025-03-25 RX ADMIN — SODIUM CHLORIDE: 9 INJECTION, SOLUTION INTRAVENOUS at 11:18

## 2025-03-25 RX ADMIN — SUGAMMADEX 173 MG: 100 INJECTION, SOLUTION INTRAVENOUS at 11:50

## 2025-03-25 ASSESSMENT — PAIN DESCRIPTION - LOCATION
LOCATION: FACE
LOCATION: FACE

## 2025-03-25 ASSESSMENT — PAIN DESCRIPTION - PAIN TYPE
TYPE: SURGICAL PAIN
TYPE: SURGICAL PAIN

## 2025-03-25 ASSESSMENT — PAIN - FUNCTIONAL ASSESSMENT
PAIN_FUNCTIONAL_ASSESSMENT: NONE - DENIES PAIN
PAIN_FUNCTIONAL_ASSESSMENT: 0-10

## 2025-03-25 ASSESSMENT — PAIN DESCRIPTION - ORIENTATION
ORIENTATION: ANTERIOR
ORIENTATION: ANTERIOR

## 2025-03-25 ASSESSMENT — PAIN SCALES - GENERAL
PAINLEVEL_OUTOF10: 3
PAINLEVEL_OUTOF10: 6

## 2025-03-25 ASSESSMENT — PAIN DESCRIPTION - DESCRIPTORS
DESCRIPTORS: DISCOMFORT;PRESSURE
DESCRIPTORS: DISCOMFORT
DESCRIPTORS: PRESSURE
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
## 2025-03-25 NOTE — DISCHARGE INSTRUCTIONS
ENT Post-Op Instructions    Follow up with Dr. Sheppard  in 1 week(s). CALL OFFICE TO SCHEDULE/CONFIRM APPOINTMENT    Please follow the instructions below:    DIET INSTRUCTIONS:  Regular diet. Start with light meals today and increase as tolerated.    ACTIVITY INSTRUCTIONS:  Increase activity as tolerated    Elevate Head of bed   No heavy lifting or strenuous activity until your cleared at your post-operative appointment   No driving while taking pain medication  No nose blowing  Use nasal saline spray 3-4 times daily        MEDICATION INSTRUCTIONS:  Take medication as prescribed.  When taking pain medications, you may experience dizziness or drowsiness.  Do not drink alcohol or drive when taking these medications.  You may take Ibuprofen (over the counter) as per directions for mild pain.  Do not take any other acetaminophen (Tylenol) products while taking your pain medication.  You may experience constipation while taking narcotic pain medication - You may take over the counter stool softeners: docuscate (Colace) or sennosides S (Senokot - S).     Call physician for any of the following or for questions/concerns:   Fever over 101° F    Unrelieved nausea/vomiting    Unrelieved pain or increase in pain     Increase in shortness of breath

## 2025-03-25 NOTE — ANESTHESIA POSTPROCEDURE EVALUATION
Department of Anesthesiology  Postprocedure Note    Patient: Bela Cifuentes  MRN: [id number]  YOB: 2005  Date of evaluation: 3/25/2025    Procedure Summary       Date: 03/25/25 Room / Location: [address]    Anesthesia Start: 1118 Anesthesia Stop: 1206    Procedure: NASAL BONE FRACTURE CLOSED REDUCTION (Nose) Diagnosis:       Nasal bone fracture      (Nasal bone fracture [S02.2XXA])    Surgeons: Mayra Sheppard DO Responsible Provider: Nena Salmeron MD    Anesthesia Type: General ASA Status: 1            Anesthesia Type: General    Shikha Phase I: Shikha Score: 10    Shikha Phase II:      Anesthesia Post Evaluation    Patient location during evaluation: PACU  Patient participation: complete - patient participated  Level of consciousness: awake and alert  Airway patency: patent  Nausea & Vomiting: no vomiting and no nausea  Cardiovascular status: hemodynamically stable  Respiratory status: spontaneous ventilation, nonlabored ventilation and acceptable  Hydration status: stable  Pain management: adequate and satisfactory to patient        No notable events documented.
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
## 2025-03-27 ENCOUNTER — TELEPHONE (OUTPATIENT)
Dept: ENT CLINIC | Age: 20
End: 2025-03-27

## 2025-03-27 DIAGNOSIS — S02.2XXB OPEN DISPLACED FRACTURE OF NASAL BONE, INITIAL ENCOUNTER: Primary | ICD-10-CM

## 2025-03-27 RX ORDER — HYDROCODONE BITARTRATE AND ACETAMINOPHEN 5; 325 MG/1; MG/1
1 TABLET ORAL EVERY 4 HOURS PRN
Qty: 12 TABLET | Refills: 0 | Status: SHIPPED | OUTPATIENT
Start: 2025-03-27 | End: 2025-03-30

## 2025-03-27 NOTE — TELEPHONE ENCOUNTER
Pt had 7 day rx for 28 tabs on 3/13  Will send in few more days to cover for nasal bone fx reduction since previous rx completed

## 2025-03-27 NOTE — TELEPHONE ENCOUNTER
Patient called office requesting refill of oxycodone be sent to [facility]'s pharmacy in [place], [place]. Patient had CRNBF 3/25/25, post op scheduled for 4/2/25.    Electronically signed by Lazara Moon MA on 3/27/25 at 11:44 AM EDT

## 2025-03-31 ENCOUNTER — HOSPITAL ENCOUNTER (OUTPATIENT)
Dept: GENERAL RADIOLOGY | Age: 20
Discharge: HOME | End: 2025-04-02
Payer: COMMERCIAL

## 2025-03-31 ENCOUNTER — OFFICE VISIT (OUTPATIENT)
Dept: ORTHOPEDIC SURGERY | Age: 20
End: 2025-03-31
Payer: COMMERCIAL

## 2025-03-31 VITALS
DIASTOLIC BLOOD PRESSURE: 75 MMHG | OXYGEN SATURATION: 98 % | HEART RATE: 94 BPM | TEMPERATURE: 97.9 F | SYSTOLIC BLOOD PRESSURE: 104 MMHG | RESPIRATION RATE: 16 BRPM

## 2025-03-31 DIAGNOSIS — S93.324A LISFRANC'S DISLOCATION, RIGHT, INITIAL ENCOUNTER: Primary | ICD-10-CM

## 2025-03-31 DIAGNOSIS — S92.901D CLOSED FRACTURE OF RIGHT FOOT WITH ROUTINE HEALING, SUBSEQUENT ENCOUNTER: ICD-10-CM

## 2025-03-31 PROCEDURE — 73630 X-RAY EXAM OF FOOT: CPT

## 2025-03-31 PROCEDURE — 99024 POSTOP FOLLOW-UP VISIT: CPT | Performed by: PHYSICIAN ASSISTANT

## 2025-03-31 PROCEDURE — L4350 ANKLE CONTROL ORTHO PRE OTS: HCPCS | Performed by: PHYSICIAN ASSISTANT

## 2025-03-31 PROCEDURE — 99213 OFFICE O/P EST LOW 20 MIN: CPT | Performed by: PHYSICIAN ASSISTANT

## 2025-03-31 RX ORDER — ASPIRIN 81 MG/1
81 TABLET ORAL 2 TIMES DAILY
Qty: 60 TABLET | Refills: 2 | Status: SHIPPED | OUTPATIENT
Start: 2025-03-31

## 2025-03-31 RX ORDER — HYDROCODONE BITARTRATE AND ACETAMINOPHEN 5; 325 MG/1; MG/1
1 TABLET ORAL EVERY 6 HOURS PRN
COMMUNITY

## 2025-03-31 RX ORDER — CETIRIZINE HYDROCHLORIDE 10 MG/1
10 TABLET ORAL DAILY
COMMUNITY
Start: 2025-03-17

## 2025-04-01 ENCOUNTER — OFFICE VISIT (OUTPATIENT)
Dept: SURGERY | Age: 20
End: 2025-04-01
Payer: COMMERCIAL

## 2025-04-01 VITALS
DIASTOLIC BLOOD PRESSURE: 71 MMHG | WEIGHT: 126 LBS | HEART RATE: 71 BPM | OXYGEN SATURATION: 100 % | SYSTOLIC BLOOD PRESSURE: 110 MMHG | BODY MASS INDEX: 23.81 KG/M2

## 2025-04-01 DIAGNOSIS — S01.21XA LACERATION OF NOSE, INITIAL ENCOUNTER: Primary | ICD-10-CM

## 2025-04-01 DIAGNOSIS — S93.324A LISFRANC'S DISLOCATION, RIGHT, INITIAL ENCOUNTER: ICD-10-CM

## 2025-04-01 DIAGNOSIS — S92.901D CLOSED FRACTURE OF RIGHT FOOT WITH ROUTINE HEALING, SUBSEQUENT ENCOUNTER: ICD-10-CM

## 2025-04-01 PROCEDURE — 1111F DSCHRG MED/CURRENT MED MERGE: CPT | Performed by: NURSE PRACTITIONER

## 2025-04-01 PROCEDURE — 99212 OFFICE O/P EST SF 10 MIN: CPT | Performed by: NURSE PRACTITIONER

## 2025-04-01 PROCEDURE — 1036F TOBACCO NON-USER: CPT | Performed by: NURSE PRACTITIONER

## 2025-04-01 PROCEDURE — G8420 CALC BMI NORM PARAMETERS: HCPCS | Performed by: NURSE PRACTITIONER

## 2025-04-01 PROCEDURE — G8427 DOCREV CUR MEDS BY ELIG CLIN: HCPCS | Performed by: NURSE PRACTITIONER

## 2025-04-01 RX ORDER — POLYETHYLENE GLYCOL 3350 17 G/17G
17 POWDER, FOR SOLUTION ORAL DAILY
Qty: 510 G | Refills: 0 | Status: SHIPPED | OUTPATIENT
Start: 2025-04-01 | End: 2025-05-01

## 2025-04-01 RX ORDER — SENNOSIDES A AND B 8.6 MG/1
1 TABLET, FILM COATED ORAL 2 TIMES DAILY
Qty: 60 TABLET | Refills: 11 | Status: SHIPPED | OUTPATIENT
Start: 2025-04-01 | End: 2026-04-01
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
## 2025-04-02 ENCOUNTER — OFFICE VISIT (OUTPATIENT)
Dept: ENT CLINIC | Age: 20
End: 2025-04-02

## 2025-04-02 VITALS
BODY MASS INDEX: 23.6 KG/M2 | HEART RATE: 94 BPM | SYSTOLIC BLOOD PRESSURE: 102 MMHG | DIASTOLIC BLOOD PRESSURE: 62 MMHG | HEIGHT: 61 IN | WEIGHT: 125 LBS | TEMPERATURE: 98.4 F

## 2025-04-02 DIAGNOSIS — S02.2XXB OPEN DISPLACED FRACTURE OF NASAL BONE, INITIAL ENCOUNTER: Primary | ICD-10-CM

## 2025-04-02 PROCEDURE — 99024 POSTOP FOLLOW-UP VISIT: CPT | Performed by: OTOLARYNGOLOGY

## 2025-04-02 ASSESSMENT — ENCOUNTER SYMPTOMS
RHINORRHEA: 0
SORE THROAT: 0
ALLERGIC/IMMUNOLOGIC NEGATIVE: 1
RESPIRATORY NEGATIVE: 1

## 2025-04-02 NOTE — PROGRESS NOTES
Subjective:     Patient ID: Bela Cifuentes is a 19 y.o. female.    HPI Comments:Pt returns today for followup, s/p CRNBFx.  Pt is doing well.There are not problems from the surgical site.     Has been using saline spray    Review of Systems   Constitutional: Negative.    HENT:  Positive for congestion. Negative for ear discharge, ear pain, hearing loss, rhinorrhea, sneezing and sore throat.    Respiratory: Negative.     Cardiovascular:  Negative for chest pain.   Musculoskeletal: Negative.    Skin: Negative.    Allergic/Immunologic: Negative.    Neurological: Negative.    Psychiatric/Behavioral: Negative.     All other systems reviewed and are negative.          Objective:   Physical Exam  Vitals reviewed.   Constitutional:       General: She is not in acute distress.     Appearance: She is well-developed.   HENT:      Head: Normocephalic and atraumatic.      Right Ear: Tympanic membrane, ear canal and external ear normal.      Left Ear: Tympanic membrane, ear canal and external ear normal.      Nose: Mucosal edema present. No nasal deformity, septal deviation or rhinorrhea.        Comments: Nasal bone straight, mild appropriate postoperative swelling  Previous traumatic incision clean dry and intact     Mouth/Throat:      Mouth: Mucous membranes are moist.      Dentition: Normal dentition.      Pharynx: Uvula midline. No oropharyngeal exudate.   Eyes:      Pupils: Pupils are equal, round, and reactive to light.   Neck:      Thyroid: No thyromegaly.      Trachea: No tracheal deviation.   Cardiovascular:      Rate and Rhythm: Normal rate.      Heart sounds: Normal heart sounds.   Pulmonary:      Effort: Pulmonary effort is normal. No respiratory distress.      Breath sounds: Normal breath sounds.   Musculoskeletal:      Cervical back: Normal range of motion and neck supple.   Lymphadenopathy:      Cervical: No cervical adenopathy.   Skin:     General: Skin is warm and dry.      Capillary Refill: Capillary refill

## 2025-04-14 ENCOUNTER — OFFICE VISIT (OUTPATIENT)
Dept: ORTHOPEDIC SURGERY | Age: 20
End: 2025-04-14
Payer: COMMERCIAL

## 2025-04-14 ENCOUNTER — HOSPITAL ENCOUNTER (OUTPATIENT)
Dept: GENERAL RADIOLOGY | Age: 20
Discharge: HOME | End: 2025-04-16
Payer: COMMERCIAL

## 2025-04-14 DIAGNOSIS — S93.324A LISFRANC'S DISLOCATION, RIGHT, INITIAL ENCOUNTER: ICD-10-CM

## 2025-04-14 DIAGNOSIS — S93.324A LISFRANC'S DISLOCATION, RIGHT, INITIAL ENCOUNTER: Primary | ICD-10-CM

## 2025-04-14 PROCEDURE — 99213 OFFICE O/P EST LOW 20 MIN: CPT | Performed by: PHYSICIAN ASSISTANT

## 2025-04-14 PROCEDURE — 73630 X-RAY EXAM OF FOOT: CPT

## 2025-04-14 PROCEDURE — 99024 POSTOP FOLLOW-UP VISIT: CPT | Performed by: PHYSICIAN ASSISTANT

## 2025-04-14 RX ORDER — ASPIRIN 81 MG/1
81 TABLET, CHEWABLE ORAL 2 TIMES DAILY PRN
Qty: 60 TABLET | Refills: 3 | Status: SHIPPED | OUTPATIENT
Start: 2025-04-14

## 2025-04-14 NOTE — PATIENT INSTRUCTIONS
Nonweightbearing right lower extremity.    Continue boot.  Okay to remove boot for hygiene and range of motion exercises of the ankle/foot.    Continue aspirin 81 mg twice daily for DVT prophylaxis.  Refill will be sent to your pharmacy.    Follow-up in 6 weeks for reevaluation and x-rays.    Call if any questions or concerns

## 2025-04-14 NOTE — PROGRESS NOTES
Chief Complaint   Patient presents with    Post-Op Check     4wk PO R Lisfranc CRPP. Pt ambulating with post -op shoe in wheelchair. Pt states intermittent pain.         OP:SURGEON: Dr. Ron Pathak DO  DATE OF PROCEDURE: 3-12-25  PROCEDURE: 1. Right first tarsometatarsal joint dislocation open reduction with internal fixation  2. Right second tarsometatarsal joint dislocation open reduction with internal fixation  3. Right third tarsometatarsal joint dislocation closed reduction requiring manipulation  4. Right fourth tarsometatarsal joint dislocation closed reduction with percutaneous pin fixation  5. Right fifth tarsometatarsal joint dislocation closed reduction percutaneous pin fixation     POD: Almost 5 weeks    Subjective:  Bela Cifuentes is following up from the above surgery. She is NWB on right lower extremity. She ambulates with assistive device, crutches.  Pain to extremity is mild and is not taking prescribed pain medication. They denies numbness or tingling to the right lower extremity. Denies calf pain, chest pain, or shortness of breath.  Patient continues to use DVT prophylaxis, ASA 81 mg BID. Patient is not participating in therapy at this time.  She is doing well after surgery.  She has been removing the boot for hygiene and to work on ankle ROM exercises.     Review of Systems -  All pertinent positives/negative in HPI     Objective:    General: Alert and oriented X 3, normocephalic atraumatic, external ears and eye normal, sclera clear, no acute distress, respirations easy and unlabored with no audible wheezes, skin warm and dry, speech and dress appropriate for noted age, affect euthymic.    Extremity:  Right Lower Extremity  Skin clean dry and intact, without signs of infection   Incision well-healed.  2 pins are intact in the lateral foot.  Pin sites are without drainage or erythema.  mild edema noted to the foot  Compartments supple throughout thigh and leg  Calf supple and not

## 2025-04-25 ENCOUNTER — SPECIALTY PHARMACY (OUTPATIENT)
Dept: PHARMACY | Facility: CLINIC | Age: 20
End: 2025-04-25
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
## 2025-05-09 DIAGNOSIS — D84.1 HEREDITARY ANGIOEDEMA (MULTI): ICD-10-CM

## 2025-05-09 DIAGNOSIS — S93.324A LISFRANC'S DISLOCATION, RIGHT, INITIAL ENCOUNTER: ICD-10-CM

## 2025-05-09 RX ORDER — ASPIRIN 81 MG/1
81 TABLET ORAL 2 TIMES DAILY
Qty: 60 TABLET | Refills: 2 | Status: SHIPPED | OUTPATIENT
Start: 2025-05-09

## 2025-05-09 RX ORDER — HUMAN C1-ESTERASE INHIBITOR 500(10 ML)
1000 KIT INTRAVENOUS ONCE AS NEEDED
Qty: 2 KIT | Refills: 3 | Status: SHIPPED | OUTPATIENT
Start: 2025-05-09

## 2025-05-09 NOTE — PROGRESS NOTES
Patient communicated to  Specialty Pharmacy she would like to fill Berinert there moving forward. She reported she currently has medication (4 boxes) on hand. Updated order placed.

## 2025-05-09 NOTE — TELEPHONE ENCOUNTER
Received call from patient requesting refill of  ASA 81 mg  at [facility].    OP:SURGEON: Dr. Ron Pathak,   DATE OF PROCEDURE: 3-12-25  PROCEDURE: 1. Right first tarsometatarsal joint dislocation open reduction with internal fixation  2. Right second tarsometatarsal joint dislocation open reduction with internal fixation  3. Right third tarsometatarsal joint dislocation closed reduction requiring manipulation  4. Right fourth tarsometatarsal joint dislocation closed reduction with percutaneous pin fixation  5. Right fifth tarsometatarsal joint dislocation closed reduction percutaneous pin fixation    Weeks from Surgery: 8    Last OV: 4/14/25    Medication pended and routed to providers for decision and signature.    Future Appointments   Date Time Provider Department Center   5/29/2025  9:30 AM SCHEDULE, [facility]       Electronically signed by Kristine Prakash RN on 5/9/2025 at 11:25 AM

## 2025-05-27 DIAGNOSIS — S93.324A LISFRANC'S DISLOCATION, RIGHT, INITIAL ENCOUNTER: Primary | ICD-10-CM

## 2025-05-29 ENCOUNTER — HOSPITAL ENCOUNTER (OUTPATIENT)
Dept: GENERAL RADIOLOGY | Age: 20
Discharge: HOME | End: 2025-05-31
Payer: COMMERCIAL

## 2025-05-29 ENCOUNTER — OFFICE VISIT (OUTPATIENT)
Age: 20
End: 2025-05-29
Payer: COMMERCIAL

## 2025-05-29 VITALS
OXYGEN SATURATION: 99 % | DIASTOLIC BLOOD PRESSURE: 77 MMHG | TEMPERATURE: 97.4 F | SYSTOLIC BLOOD PRESSURE: 110 MMHG | RESPIRATION RATE: 20 BRPM | HEART RATE: 82 BPM

## 2025-05-29 DIAGNOSIS — S93.324A LISFRANC'S DISLOCATION, RIGHT, INITIAL ENCOUNTER: ICD-10-CM

## 2025-05-29 DIAGNOSIS — S93.324A LISFRANC'S DISLOCATION, RIGHT, INITIAL ENCOUNTER: Primary | ICD-10-CM

## 2025-05-29 PROCEDURE — 73630 X-RAY EXAM OF FOOT: CPT

## 2025-05-29 PROCEDURE — 99024 POSTOP FOLLOW-UP VISIT: CPT | Performed by: PHYSICIAN ASSISTANT

## 2025-05-29 NOTE — PROGRESS NOTES
Chief Complaint   Patient presents with    Follow-up      10wk p/o ORIF Pin removal Right Lisfranc Type A Fx 3/12/25; JVG (*GP 6/10/25). Pain lv 3        OP:SURGEON: Dr. Ron Pathak DO  DATE OF PROCEDURE: 3-12-25  PROCEDURE: 1. Right first tarsometatarsal joint dislocation open reduction with internal fixation  2. Right second tarsometatarsal joint dislocation open reduction with internal fixation  3. Right third tarsometatarsal joint dislocation closed reduction requiring manipulation  4. Right fourth tarsometatarsal joint dislocation closed reduction with percutaneous pin fixation  5. Right fifth tarsometatarsal joint dislocation closed reduction percutaneous pin fixation     POD: 11 weeks    Subjective:  Bela Cifuentes is following up from the above surgery. She is NWB on right lower extremity. She ambulates with assistive device, crutches.  Pain to extremity is mild and is not taking prescribed pain medication. They denies numbness or tingling to the right lower extremity. Denies calf pain, chest pain, or shortness of breath.  Patient continues to use DVT prophylaxis, ASA 81 mg BID. Patient is not participating in therapy at this time.  She is doing okay after surgery.  She has been removing the boot to work on ROM exercises of the ankle/foot.     Review of Systems -  All pertinent positives/negative in HPI     Objective:    General: Alert and oriented X 3, normocephalic atraumatic, external ears and eye normal, sclera clear, no acute distress, respirations easy and unlabored with no audible wheezes, skin warm and dry, speech and dress appropriate for noted age, affect euthymic.    Extremity:  Right Lower Extremity  Skin clean dry and intact, without signs of infection   Incision well-healed  mild edema and discoloration noted to the foot  Compartments supple throughout thigh and leg  Calf supple and not tender  negative Homans  Demonstrates active motion with ankle DF/PF/EHL  States sensation intact to

## 2025-05-29 NOTE — PATIENT INSTRUCTIONS
Start progressive weightbearing on the right lower extremity in Boot. Start with 25% of body weight for 5-7 days and if tolerating well, no significant increased pain or swelling ok to progress to 50% of body weight in Boot for 5-7 days. Then to 75% of body weight for 5-7 days, then to 100% on right lower extremity in the Boot. Once full weight bearing in the Boot for 1-2 weeks and tolerating well OK to start weaning out of Boot.      At this point, boot only needs to be worn while weightbearing.    Patient will be set up for PT at [facility] in [place]    Continue aspirin for DVT prophylaxis until fully weightbearing.    Follow-up in 2 months for reevaluation and x-rays.    Call if any questions or concerns

## 2025-06-05 ENCOUNTER — TELEPHONE (OUTPATIENT)
Age: 20
End: 2025-06-05

## 2025-06-05 DIAGNOSIS — S93.324A LISFRANC'S DISLOCATION, RIGHT, INITIAL ENCOUNTER: Primary | ICD-10-CM

## 2025-06-05 NOTE — TELEPHONE ENCOUNTER
Pt called. States PT in [place] doesn't accept her ins.  She would like to be referred to Dora ANDERSON ([phone] )

## 2025-06-26 ENCOUNTER — HOSPITAL ENCOUNTER (EMERGENCY)
Facility: HOSPITAL | Age: 20
Discharge: HOME | End: 2025-06-26
Attending: STUDENT IN AN ORGANIZED HEALTH CARE EDUCATION/TRAINING PROGRAM
Payer: COMMERCIAL

## 2025-06-26 VITALS
SYSTOLIC BLOOD PRESSURE: 132 MMHG | HEIGHT: 61 IN | TEMPERATURE: 97.8 F | BODY MASS INDEX: 22.66 KG/M2 | RESPIRATION RATE: 16 BRPM | OXYGEN SATURATION: 100 % | WEIGHT: 120 LBS | HEART RATE: 95 BPM | DIASTOLIC BLOOD PRESSURE: 71 MMHG

## 2025-06-26 DIAGNOSIS — R51.9 NONINTRACTABLE HEADACHE, UNSPECIFIED CHRONICITY PATTERN, UNSPECIFIED HEADACHE TYPE: Primary | ICD-10-CM

## 2025-06-26 PROCEDURE — 99281 EMR DPT VST MAYX REQ PHY/QHP: CPT

## 2025-06-26 PROCEDURE — 99282 EMERGENCY DEPT VISIT SF MDM: CPT | Performed by: STUDENT IN AN ORGANIZED HEALTH CARE EDUCATION/TRAINING PROGRAM

## 2025-06-26 ASSESSMENT — PAIN SCALES - GENERAL
PAINLEVEL_OUTOF10: 4
PAINLEVEL_OUTOF10: 3

## 2025-06-26 ASSESSMENT — PAIN DESCRIPTION - LOCATION: LOCATION: HEAD

## 2025-06-26 ASSESSMENT — PAIN DESCRIPTION - PAIN TYPE: TYPE: CHRONIC PAIN

## 2025-06-26 ASSESSMENT — PAIN DESCRIPTION - DESCRIPTORS: DESCRIPTORS: HEADACHE

## 2025-06-26 ASSESSMENT — PAIN DESCRIPTION - PROGRESSION: CLINICAL_PROGRESSION: GRADUALLY IMPROVING

## 2025-06-26 ASSESSMENT — PAIN - FUNCTIONAL ASSESSMENT
PAIN_FUNCTIONAL_ASSESSMENT: 0-10
PAIN_FUNCTIONAL_ASSESSMENT: 0-10
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
## 2025-07-23 ENCOUNTER — HOSPITAL ENCOUNTER (EMERGENCY)
Facility: HOSPITAL | Age: 20
Discharge: HOME | End: 2025-07-24
Attending: EMERGENCY MEDICINE
Payer: COMMERCIAL

## 2025-07-23 DIAGNOSIS — Z75.8 DOES NOT HAVE PRIMARY CARE PROVIDER: ICD-10-CM

## 2025-07-23 DIAGNOSIS — N30.90 CYSTITIS: Primary | ICD-10-CM

## 2025-07-23 DIAGNOSIS — N92.6 IRREGULAR MENSES: ICD-10-CM

## 2025-07-23 PROCEDURE — 99283 EMERGENCY DEPT VISIT LOW MDM: CPT | Performed by: EMERGENCY MEDICINE

## 2025-07-23 PROCEDURE — 81025 URINE PREGNANCY TEST: CPT | Performed by: EMERGENCY MEDICINE

## 2025-07-23 PROCEDURE — 81001 URINALYSIS AUTO W/SCOPE: CPT | Performed by: EMERGENCY MEDICINE

## 2025-07-23 ASSESSMENT — PAIN DESCRIPTION - PAIN TYPE: TYPE: ACUTE PAIN

## 2025-07-23 ASSESSMENT — PAIN DESCRIPTION - ORIENTATION: ORIENTATION: LEFT

## 2025-07-23 ASSESSMENT — PAIN - FUNCTIONAL ASSESSMENT: PAIN_FUNCTIONAL_ASSESSMENT: 0-10

## 2025-07-23 ASSESSMENT — PAIN SCALES - GENERAL: PAINLEVEL_OUTOF10: 2

## 2025-07-23 ASSESSMENT — PAIN DESCRIPTION - LOCATION: LOCATION: ABDOMEN

## 2025-07-24 VITALS
BODY MASS INDEX: 22.66 KG/M2 | HEART RATE: 82 BPM | RESPIRATION RATE: 19 BRPM | SYSTOLIC BLOOD PRESSURE: 121 MMHG | HEIGHT: 61 IN | DIASTOLIC BLOOD PRESSURE: 89 MMHG | TEMPERATURE: 98 F | OXYGEN SATURATION: 98 % | WEIGHT: 120 LBS

## 2025-07-24 PROBLEM — N30.90 CYSTITIS: Status: ACTIVE | Noted: 2025-07-24

## 2025-07-24 PROBLEM — Z75.8 DOES NOT HAVE PRIMARY CARE PROVIDER: Status: ACTIVE | Noted: 2025-07-24

## 2025-07-24 LAB
APPEARANCE UR: CLEAR
BACTERIA #/AREA URNS AUTO: ABNORMAL /HPF
BILIRUB UR STRIP.AUTO-MCNC: NEGATIVE MG/DL
COLOR UR: ABNORMAL
GLUCOSE UR STRIP.AUTO-MCNC: NEGATIVE MG/DL
HCG UR QL IA.RAPID: NEGATIVE
KETONES UR STRIP.AUTO-MCNC: NEGATIVE MG/DL
LEUKOCYTE ESTERASE UR QL STRIP.AUTO: ABNORMAL
NITRITE UR QL STRIP.AUTO: NEGATIVE
PH UR STRIP.AUTO: 6 [PH]
PROT UR STRIP.AUTO-MCNC: NEGATIVE MG/DL
RBC # UR STRIP.AUTO: ABNORMAL MG/DL
RBC #/AREA URNS AUTO: ABNORMAL /HPF
SP GR UR STRIP.AUTO: 1.01
SQUAMOUS #/AREA URNS AUTO: ABNORMAL /HPF
UROBILINOGEN UR STRIP.AUTO-MCNC: <2 MG/DL
WBC #/AREA URNS AUTO: ABNORMAL /HPF

## 2025-07-24 PROCEDURE — 2500000002 HC RX 250 W HCPCS SELF ADMINISTERED DRUGS (ALT 637 FOR MEDICARE OP, ALT 636 FOR OP/ED): Mod: SE | Performed by: EMERGENCY MEDICINE

## 2025-07-24 RX ORDER — NITROFURANTOIN 25; 75 MG/1; MG/1
100 CAPSULE ORAL EVERY 12 HOURS SCHEDULED
Status: DISCONTINUED | OUTPATIENT
Start: 2025-07-24 | End: 2025-07-24 | Stop reason: HOSPADM

## 2025-07-24 RX ORDER — NITROFURANTOIN 25; 75 MG/1; MG/1
100 CAPSULE ORAL 2 TIMES DAILY
Qty: 10 CAPSULE | Refills: 0 | Status: SHIPPED | OUTPATIENT
Start: 2025-07-24 | End: 2025-07-29

## 2025-07-24 RX ADMIN — NITROFURANTOIN MONOHYDRATE/MACROCRYSTALS 100 MG: 25; 75 CAPSULE ORAL at 00:34

## 2025-07-24 ASSESSMENT — PAIN SCALES - GENERAL: PAINLEVEL_OUTOF10: 1

## 2025-07-24 NOTE — ED TRIAGE NOTES
Patient ambulatory to ED for possible pregnancy. Reports she took 5 home tests today with varying results. Reports nausea, and 2/10 LLQ pain.  LMP was 7/4 and lasted 2 weeks.

## 2025-07-24 NOTE — ED PROVIDER NOTES
"HPI   Chief Complaint   Patient presents with    Possible Pregnancy         History provided by:  Medical records and patient   used: No      This patient presents to the emergency department ambulatory via private vehicle because she is concerned she may have an unwanted pregnancy.  Patient states her last menstrual period was at the beginning of July.  She says it was longer and heavier than she customarily expects.  She says she is on oral contraceptives, and while she does occasionally miss doses, she was compliant all of last month.    She denies any fever.  She has had some nausea particularly when she is working out.  She has noted urinary frequency and occasional dysuria.  No hematuria.  No abnormal vaginal discharge.  She is sexually active in a monogamous relationship and denies possibility for STD.    She says she has noted that she has been more fatigued lately, and today was having some dull aching in the left upper quadrant of her abdomen.  She says she talked this over with her family and her sister, who is pregnant, said these are the same symptoms that she was having.  Patient took some home pregnancy test and got both positive and negative results although on the positive results \"the pink line was very faint\".    Patient states she does not wish to be pregnant and would like to know for sure if she is.      Patient History   Medical History[1]  Surgical History[2]  Family History[3]  Social History[4]    Physical Exam   ED Triage Vitals [07/23/25 2322]   Temperature Heart Rate Respirations BP   36.8 °C (98.3 °F) (!) 106 16 (!) 146/94      SpO2 Temp src Heart Rate Source Patient Position   96 % -- -- --      BP Location FiO2 (%)     -- --       Physical Exam  Vitals reviewed.   Constitutional:       Appearance: Normal appearance.   HENT:      Head: Normocephalic and atraumatic.      Nose: Nose normal.      Mouth/Throat:      Mouth: Mucous membranes are moist.     Eyes:      " Pupils: Pupils are equal, round, and reactive to light.       Cardiovascular:      Rate and Rhythm: Normal rate and regular rhythm.      Pulses: Normal pulses.      Heart sounds: Normal heart sounds.   Pulmonary:      Effort: Pulmonary effort is normal.      Breath sounds: Normal breath sounds.   Abdominal:      General: Abdomen is flat. There is no distension.      Palpations: Abdomen is soft. There is no mass.      Tenderness: There is no abdominal tenderness.     Musculoskeletal:         General: Normal range of motion.      Cervical back: Normal range of motion.     Skin:     General: Skin is warm and dry.      Capillary Refill: Capillary refill takes less than 2 seconds.      Findings: No rash.     Neurological:      General: No focal deficit present.      Mental Status: She is alert and oriented to person, place, and time.     Psychiatric:         Mood and Affect: Mood normal.           ED Course & MDM   ED Course as of 07/24/25 0027   Thu Jul 24, 2025   0018 Patient reassessed, results reviewed with patient [MN]      ED Course User Index  [MN] Rimma Ang MD         Diagnoses as of 07/24/25 0027   Cystitis   Irregular menses   Does not have primary care provider          Labs Reviewed   URINALYSIS WITH REFLEX MICROSCOPIC - Abnormal       Result Value    Color, Urine Straw      Appearance, Urine Clear      Specific Gravity, Urine 1.008      pH, Urine 6.0      Protein, Urine NEGATIVE      Glucose, Urine NEGATIVE      Blood, Urine SMALL (1+) (*)     Ketones, Urine NEGATIVE      Bilirubin, Urine NEGATIVE      Urobilinogen, Urine <2.0      Nitrite, Urine NEGATIVE      Leukocyte Esterase, Urine SMALL (1+) (*)    MICROSCOPIC ONLY, URINE - Abnormal    WBC, Urine 6-10 (*)     RBC, Urine 1-2      Squamous Epithelial Cells, Urine 1-9 (SPARSE)      Bacteria, Urine 1+ (*)    HCG, URINE, QUALITATIVE - Normal    HCG, Urine NEGATIVE                No data recorded                                 Medical Decision  Making  Patient presents emergency department the above history and physical.  No signs of sepsis, dehydration, acute abdomen.  Urine pregnancy is negative.  Urinalysis does show some pyuria; therefore urine culture obtained and oral Macrobid prescribed with first dose given in the emergency department.  Patient given referral for primary care.    Results of exam and any testing were discussed with patient/family. To the best of my ability, I answered all questions. At this time, there is no indication for admission/transfer or further diagnostic testing. Patient understands to return for any new or worsening symptoms, or failure to improve as anticipated. The importance of follow-up was stressed.    Procedure  Procedures       [1]   Past Medical History:  Diagnosis Date    Hereditary angio-edema    [2] History reviewed. No pertinent surgical history.  [3] No family history on file.  [4]   Social History  Tobacco Use    Smoking status: Never    Smokeless tobacco: Never   Vaping Use    Vaping status: Never Used   Substance Use Topics    Alcohol use: Never    Drug use: Yes     Types: Marijuana     Comment: daily        Rimma Ang MD  07/24/25 0028

## 2025-07-24 NOTE — DISCHARGE INSTRUCTIONS
Culture has been obtained today. This may take up to 3 days to be finalized. We can call you, if a change in treatment is needed.    Return to the emergency department for fever, vomiting, abdominal pain, back or flank pain.

## 2025-07-25 DIAGNOSIS — S93.324A LISFRANC'S DISLOCATION, RIGHT, INITIAL ENCOUNTER: Primary | ICD-10-CM

## 2025-07-28 ENCOUNTER — SPECIALTY PHARMACY (OUTPATIENT)
Dept: PHARMACY | Facility: CLINIC | Age: 20
End: 2025-07-28
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
## 2025-07-29 DIAGNOSIS — L50.8 CHRONIC URTICARIA: ICD-10-CM

## 2025-07-29 RX ORDER — CETIRIZINE HYDROCHLORIDE 10 MG/1
10 TABLET ORAL DAILY
Qty: 60 TABLET | Refills: 3 | Status: SHIPPED | OUTPATIENT
Start: 2025-07-29 | End: 2026-03-26

## 2025-07-31 ENCOUNTER — HOSPITAL ENCOUNTER (OUTPATIENT)
Dept: GENERAL RADIOLOGY | Age: 20
Discharge: HOME | End: 2025-08-02
Payer: COMMERCIAL

## 2025-07-31 ENCOUNTER — OFFICE VISIT (OUTPATIENT)
Age: 20
End: 2025-07-31
Payer: COMMERCIAL

## 2025-07-31 VITALS
OXYGEN SATURATION: 98 % | HEART RATE: 78 BPM | RESPIRATION RATE: 20 BRPM | TEMPERATURE: 97.7 F | DIASTOLIC BLOOD PRESSURE: 76 MMHG | SYSTOLIC BLOOD PRESSURE: 113 MMHG

## 2025-07-31 DIAGNOSIS — S93.324A LISFRANC'S DISLOCATION, RIGHT, INITIAL ENCOUNTER: Primary | ICD-10-CM

## 2025-07-31 DIAGNOSIS — S93.324A LISFRANC'S DISLOCATION, RIGHT, INITIAL ENCOUNTER: ICD-10-CM

## 2025-07-31 PROCEDURE — 99212 OFFICE O/P EST SF 10 MIN: CPT | Performed by: ORTHOPAEDIC SURGERY

## 2025-07-31 PROCEDURE — 73630 X-RAY EXAM OF FOOT: CPT

## 2025-07-31 PROCEDURE — 1036F TOBACCO NON-USER: CPT | Performed by: ORTHOPAEDIC SURGERY

## 2025-07-31 PROCEDURE — G8427 DOCREV CUR MEDS BY ELIG CLIN: HCPCS | Performed by: ORTHOPAEDIC SURGERY

## 2025-07-31 PROCEDURE — 99213 OFFICE O/P EST LOW 20 MIN: CPT | Performed by: ORTHOPAEDIC SURGERY

## 2025-07-31 PROCEDURE — G8420 CALC BMI NORM PARAMETERS: HCPCS | Performed by: ORTHOPAEDIC SURGERY

## 2025-07-31 NOTE — PROGRESS NOTES
Ortho Clinic Note    Chief Complaint   Patient presents with    Follow-up      ORIF Pin removal Right Lisfranc Type A Fx 3/12/25; JVG. Pain lvl 2       OP:SURGEON: Dr. Ron Pathak DO  DATE OF PROCEDURE: 3-12-25  PROCEDURE: 1. Right first tarsometatarsal joint dislocation open reduction with internal fixation  2. Right second tarsometatarsal joint dislocation open reduction with internal fixation  3. Right third tarsometatarsal joint dislocation closed reduction requiring manipulation  4. Right fourth tarsometatarsal joint dislocation closed reduction with percutaneous pin fixation  5. Right fifth tarsometatarsal joint dislocation closed reduction percutaneous pin fixation     Subjective:  Bela Cifuentes is following up from the above surgery.  She is full weightbearing without assistive devices.  Overall feels she is doing well.  Still has some issues with angioedema and swelling and feels that aggravates her discomfort but otherwise she continues to progress.  Does still have some discoloration which is gravity dependent or worse in the shower.  This is relatively unchanged.  Denies any issues with her incisions or wounds.  Has finished therapy.  No other interval issues or concerns.    Review of Systems -  All pertinent positives/negative in HPI     Objective:    General: Alert and oriented X 3, normocephalic atraumatic, external ears and eye normal, sclera clear, no acute distress, respirations easy and unlabored with no audible wheezes, skin warm and dry, speech and dress appropriate for noted age, affect euthymic.    Extremity:  Right Lower Extremity  Skin clean dry and intact, without signs of infection   Incision well-healed  No significant edema today, mild generalized discoloration of foot, relatively unchanged  Compartments supple throughout thigh and leg  No focal tenderness to palpation about the foot, no gross deformity  Calf supple and not tender, negative Homans  Demonstrates active motion with

## 2025-08-07 ENCOUNTER — TELEPHONE (OUTPATIENT)
Dept: ALLERGY | Facility: CLINIC | Age: 20
End: 2025-08-07
Payer: COMMERCIAL

## 2025-08-07 ENCOUNTER — TELEPHONE (OUTPATIENT)
Dept: ALLERGY | Facility: HOSPITAL | Age: 20
End: 2025-08-07
Payer: COMMERCIAL

## 2025-08-16 ENCOUNTER — OFFICE VISIT (OUTPATIENT)
Dept: URGENT CARE | Facility: URGENT CARE | Age: 20
End: 2025-08-16
Payer: COMMERCIAL

## 2025-08-16 VITALS
WEIGHT: 119.71 LBS | DIASTOLIC BLOOD PRESSURE: 82 MMHG | BODY MASS INDEX: 22.6 KG/M2 | SYSTOLIC BLOOD PRESSURE: 120 MMHG | HEIGHT: 61 IN | HEART RATE: 88 BPM | OXYGEN SATURATION: 98 % | RESPIRATION RATE: 18 BRPM | TEMPERATURE: 98.7 F

## 2025-08-16 DIAGNOSIS — Z71.1 CONCERN ABOUT STD IN FEMALE WITHOUT DIAGNOSIS: ICD-10-CM

## 2025-08-16 DIAGNOSIS — Z11.3 SCREEN FOR STD (SEXUALLY TRANSMITTED DISEASE): ICD-10-CM

## 2025-08-16 DIAGNOSIS — R30.0 DYSURIA: Primary | ICD-10-CM

## 2025-08-16 LAB
CHLAMYDIA TRACHOMATIS: NOT DETECTED
ELECTRONIC CONTROL: NORMAL
INTERNAL PROCESS CONTROL: NORMAL
NEISSERIA GONORRHOEAE: NOT DETECTED
POC APPEARANCE, URINE: CLEAR
POC BACTERIAL VAGINITIS (RAPID): NEGATIVE
POC BILIRUBIN, URINE: NEGATIVE
POC BLOOD, URINE: NEGATIVE
POC COLOR, URINE: YELLOW
POC GLUCOSE, URINE: NEGATIVE MG/DL
POC KETONES, URINE: NEGATIVE MG/DL
POC LEUKOCYTES, URINE: NEGATIVE
POC NITRITE,URINE: NEGATIVE
POC PH, URINE: 7 PH
POC PROTEIN, URINE: NEGATIVE MG/DL
POC SPECIFIC GRAVITY, URINE: 1.01
POC TRICHOMONAS: NEGATIVE
POC UROBILINOGEN, URINE: 0.2 EU/DL
PREGNANCY TEST URINE, POC: NEGATIVE

## 2025-08-16 RX ORDER — NORGESTIMATE AND ETHINYL ESTRADIOL 0.25-0.035
KIT ORAL
COMMUNITY
Start: 2025-07-18

## 2025-08-16 ASSESSMENT — ENCOUNTER SYMPTOMS
VOMITING: 0
NAUSEA: 0
FREQUENCY: 0
NECK STIFFNESS: 0
DIZZINESS: 0
SHORTNESS OF BREATH: 0
FLANK PAIN: 0
DIARRHEA: 0
CONFUSION: 0
DIAPHORESIS: 0
BLOOD IN STOOL: 0
BACK PAIN: 0
CHEST TIGHTNESS: 0
HEMATURIA: 0
ACTIVITY CHANGE: 0
LIGHT-HEADEDNESS: 0
DYSURIA: 1
NECK PAIN: 0
FEVER: 0
APPETITE CHANGE: 0
PALPITATIONS: 0
COUGH: 0
ABDOMINAL PAIN: 0
DIFFICULTY URINATING: 1
CHILLS: 0
ABDOMINAL DISTENTION: 0
HEADACHES: 0
FATIGUE: 0
MYALGIAS: 0
WHEEZING: 0

## 2025-08-18 LAB — BACTERIA UR CULT: NORMAL

## 2025-08-19 ENCOUNTER — HOSPITAL ENCOUNTER (EMERGENCY)
Facility: HOSPITAL | Age: 20
Discharge: LEFT BEFORE DISCHARGE | End: 2025-08-19
Attending: FAMILY MEDICINE
Payer: COMMERCIAL

## 2025-08-19 VITALS
WEIGHT: 120 LBS | RESPIRATION RATE: 16 BRPM | OXYGEN SATURATION: 100 % | DIASTOLIC BLOOD PRESSURE: 76 MMHG | SYSTOLIC BLOOD PRESSURE: 128 MMHG | BODY MASS INDEX: 22.66 KG/M2 | HEIGHT: 61 IN | TEMPERATURE: 98.3 F | HEART RATE: 92 BPM

## 2025-08-19 DIAGNOSIS — K12.1 VIRAL STOMATITIS: Primary | ICD-10-CM

## 2025-08-19 DIAGNOSIS — B97.89 VIRAL STOMATITIS: Primary | ICD-10-CM

## 2025-08-19 LAB — S PYO DNA THROAT QL NAA+PROBE: NOT DETECTED

## 2025-08-19 PROCEDURE — 99283 EMERGENCY DEPT VISIT LOW MDM: CPT | Performed by: FAMILY MEDICINE

## 2025-08-19 PROCEDURE — 87651 STREP A DNA AMP PROBE: CPT | Performed by: FAMILY MEDICINE

## 2025-08-19 ASSESSMENT — PAIN SCALES - GENERAL: PAINLEVEL_OUTOF10: 4

## 2025-08-19 ASSESSMENT — PAIN DESCRIPTION - LOCATION: LOCATION: MOUTH

## 2025-08-19 ASSESSMENT — PAIN - FUNCTIONAL ASSESSMENT: PAIN_FUNCTIONAL_ASSESSMENT: 0-10

## 2025-08-19 ASSESSMENT — PAIN DESCRIPTION - PAIN TYPE: TYPE: ACUTE PAIN

## 2025-10-20 ENCOUNTER — APPOINTMENT (OUTPATIENT)
Dept: ALLERGY | Facility: CLINIC | Age: 20
End: 2025-10-20
Payer: COMMERCIAL

## (undated) DEVICE — Device: Brand: DENVER SPLINT

## (undated) DEVICE — GLOVE SURG SZ 6 THK91MIL LTX FREE SYN POLYISOPRENE ANTI

## (undated) DEVICE — SET SURG BASIN MAYO REUSABLE

## (undated) DEVICE — PADDING,UNDERCAST,COTTON, 4"X4YD STERILE: Brand: MEDLINE

## (undated) DEVICE — STRIP,CLOSURE,WOUND,MEDI-STRIP,1/2X4: Brand: MEDLINE

## (undated) DEVICE — SPONGE,NEURO,0.5"X3",XR,STRL,LF,10/PK: Brand: MEDLINE

## (undated) DEVICE — NEEDLE HYPO 25GA L1.5IN BLU POLYPR HUB S STL REG BVL STR

## (undated) DEVICE — 4-PORT MANIFOLD: Brand: NEPTUNE 2

## (undated) DEVICE — BIT DRL L160MM DIA2.7MM ST CANN QUIK CPL NONRADIOLUCENT ADJ

## (undated) DEVICE — DUAL LUMEN STOMACH TUBE: Brand: SALEM SUMP

## (undated) DEVICE — LIGHT SOURCE WHT

## (undated) DEVICE — YANKAUER,OPEN TIP,W/O VENT,STERILE: Brand: MEDLINE INDUSTRIES, INC.

## (undated) DEVICE — TUBING SUCT 12FR MAL ALUM SHFT FN CAP VENT UNIV CONN W/ OBT

## (undated) DEVICE — TOWEL,OR,DSP,ST,BLUE,STD,6/PK,12PK/CS: Brand: MEDLINE

## (undated) DEVICE — ELECTRODE PT RET AD L9FT HI MOIST COND ADH HYDRGEL CORDED

## (undated) DEVICE — BANDAGE COMPR W4INXL10YD WHITE/BEIGE E MTRX HK LOOP CLSR

## (undated) DEVICE — SPONGE GZ W4XL4IN RAYON POLY CVR W/NONWOVEN FAB STRL 2/PK

## (undated) DEVICE — DRESSING,GAUZE,XEROFORM,CURAD,5"X9",ST: Brand: CURAD

## (undated) DEVICE — ELECTRODE ES L3IN S STL BLDE INSUL DISP VALLEYLAB EDGE

## (undated) DEVICE — BASIC SINGLE BASIN 1-LF: Brand: MEDLINE INDUSTRIES, INC.

## (undated) DEVICE — MASTISOL ADHESIVE LIQ 2/3ML

## (undated) DEVICE — DRAPE EQUIP CARM 72X42 IN RUBBER BND CLP

## (undated) DEVICE — SYRINGE MED 10ML TRNSLUC BRL PLUNG BLK MRK POLYPR CTRL

## (undated) DEVICE — GUIDEWIRE ORTH L150MM DIA1.25MM S STL NTHRD FOR 4MM CANN

## (undated) DEVICE — LOWER EXT KNEE DRAPE: Brand: MEDLINE INDUSTRIES, INC.

## (undated) DEVICE — SOLUTION IRRIG 1000ML STRL H2O USP PLAS POUR BTL

## (undated) DEVICE — MEDICINE CUP, GRADUATED, STER: Brand: MEDLINE

## (undated) DEVICE — AGENT HEMSTAT W1XL2IN ABSRB SFT LTWT LAYERED ORC FIBRILLAR

## (undated) DEVICE — SOLUTION IRRIG 1000ML 0.9% SOD CHL USP POUR PLAS BTL